# Patient Record
Sex: MALE | Race: WHITE | Employment: OTHER | URBAN - METROPOLITAN AREA
[De-identification: names, ages, dates, MRNs, and addresses within clinical notes are randomized per-mention and may not be internally consistent; named-entity substitution may affect disease eponyms.]

---

## 2017-03-24 ENCOUNTER — APPOINTMENT (OUTPATIENT)
Dept: GENERAL RADIOLOGY | Age: 62
End: 2017-03-24
Payer: MEDICARE

## 2017-03-24 ENCOUNTER — HOSPITAL ENCOUNTER (EMERGENCY)
Age: 62
Discharge: HOME | End: 2017-03-24
Payer: MEDICARE

## 2017-03-24 VITALS
SYSTOLIC BLOOD PRESSURE: 119 MMHG | BODY MASS INDEX: 30.31 KG/M2 | HEIGHT: 68 IN | WEIGHT: 200 LBS | OXYGEN SATURATION: 95 % | DIASTOLIC BLOOD PRESSURE: 71 MMHG | HEART RATE: 93 BPM | TEMPERATURE: 98.5 F | RESPIRATION RATE: 20 BRPM

## 2017-03-24 DIAGNOSIS — M79.604 PAIN IN BOTH LOWER EXTREMITIES: ICD-10-CM

## 2017-03-24 DIAGNOSIS — M79.605 PAIN IN BOTH LOWER EXTREMITIES: ICD-10-CM

## 2017-03-24 DIAGNOSIS — R10.84 GENERALIZED ABDOMINAL PAIN: Primary | ICD-10-CM

## 2017-03-24 DIAGNOSIS — M54.50 CHRONIC LOW BACK PAIN WITHOUT SCIATICA, UNSPECIFIED BACK PAIN LATERALITY: ICD-10-CM

## 2017-03-24 DIAGNOSIS — G89.29 CHRONIC LOW BACK PAIN WITHOUT SCIATICA, UNSPECIFIED BACK PAIN LATERALITY: ICD-10-CM

## 2017-03-24 LAB
ALBUMIN SERPL-MCNC: 4.3 G/DL (ref 3.9–4.9)
ALP BLD-CCNC: 91 U/L (ref 35–104)
ALT SERPL-CCNC: 26 U/L (ref 0–41)
AMYLASE: 54 U/L (ref 28–100)
ANION GAP SERPL CALCULATED.3IONS-SCNC: 13 MEQ/L (ref 7–13)
APTT: 23.7 SEC (ref 21.6–35.4)
AST SERPL-CCNC: 15 U/L (ref 0–40)
BASOPHILS ABSOLUTE: 0.1 K/UL (ref 0–0.2)
BASOPHILS RELATIVE PERCENT: 1 %
BILIRUB SERPL-MCNC: 0.3 MG/DL (ref 0–1.2)
BUN BLDV-MCNC: 19 MG/DL (ref 8–23)
CALCIUM SERPL-MCNC: 9.9 MG/DL (ref 8.6–10.2)
CHLORIDE BLD-SCNC: 96 MEQ/L (ref 98–107)
CK MB: 5.1 NG/ML (ref 0–6.7)
CO2: 25 MEQ/L (ref 22–29)
CREAT SERPL-MCNC: 0.55 MG/DL (ref 0.7–1.2)
CREATINE KINASE-MB INDEX: 5.9 % (ref 0–3.5)
EOSINOPHILS ABSOLUTE: 0.3 K/UL (ref 0–0.7)
EOSINOPHILS RELATIVE PERCENT: 2.5 %
GFR AFRICAN AMERICAN: >60
GFR NON-AFRICAN AMERICAN: >60
GLOBULIN: 2 G/DL (ref 2.3–3.5)
GLUCOSE BLD-MCNC: 225 MG/DL (ref 74–109)
HCT VFR BLD CALC: 44.8 % (ref 42–52)
HEMOGLOBIN: 14.7 G/DL (ref 14–18)
INR BLD: 0.9
LIPASE: 45 U/L (ref 13–60)
LYMPHOCYTES ABSOLUTE: 2.7 K/UL (ref 1–4.8)
LYMPHOCYTES RELATIVE PERCENT: 24.4 %
MCH RBC QN AUTO: 29.3 PG (ref 27–31.3)
MCHC RBC AUTO-ENTMCNC: 32.9 % (ref 33–37)
MCV RBC AUTO: 89 FL (ref 80–100)
MONOCYTES ABSOLUTE: 0.9 K/UL (ref 0.2–0.8)
MONOCYTES RELATIVE PERCENT: 8.6 %
NEUTROPHILS ABSOLUTE: 7 K/UL (ref 1.4–6.5)
NEUTROPHILS RELATIVE PERCENT: 63.5 %
PDW BLD-RTO: 14.6 % (ref 11.5–14.5)
PLATELET # BLD: 181 K/UL (ref 130–400)
POTASSIUM SERPL-SCNC: 4.3 MEQ/L (ref 3.5–5.1)
PROTHROMBIN TIME: 10 SEC (ref 8.1–13.7)
RBC # BLD: 5.03 M/UL (ref 4.7–6.1)
SODIUM BLD-SCNC: 134 MEQ/L (ref 132–144)
TOTAL CK: 86 U/L (ref 0–190)
TOTAL PROTEIN: 6.3 G/DL (ref 6.4–8.1)
TROPONIN: <0.01 NG/ML (ref 0–0.01)
WBC # BLD: 11 K/UL (ref 4.8–10.8)

## 2017-03-24 PROCEDURE — 82550 ASSAY OF CK (CPK): CPT

## 2017-03-24 PROCEDURE — 71020 XR CHEST STANDARD TWO VW: CPT

## 2017-03-24 PROCEDURE — 80053 COMPREHEN METABOLIC PANEL: CPT

## 2017-03-24 PROCEDURE — 85025 COMPLETE CBC W/AUTO DIFF WBC: CPT

## 2017-03-24 PROCEDURE — 82553 CREATINE MB FRACTION: CPT

## 2017-03-24 PROCEDURE — 36415 COLL VENOUS BLD VENIPUNCTURE: CPT

## 2017-03-24 PROCEDURE — 99284 EMERGENCY DEPT VISIT MOD MDM: CPT

## 2017-03-24 PROCEDURE — 85730 THROMBOPLASTIN TIME PARTIAL: CPT

## 2017-03-24 PROCEDURE — 6360000002 HC RX W HCPCS: Performed by: PHYSICIAN ASSISTANT

## 2017-03-24 PROCEDURE — 85610 PROTHROMBIN TIME: CPT

## 2017-03-24 PROCEDURE — 82150 ASSAY OF AMYLASE: CPT

## 2017-03-24 PROCEDURE — 83690 ASSAY OF LIPASE: CPT

## 2017-03-24 PROCEDURE — 96372 THER/PROPH/DIAG INJ SC/IM: CPT

## 2017-03-24 PROCEDURE — 84484 ASSAY OF TROPONIN QUANT: CPT

## 2017-03-24 PROCEDURE — 93005 ELECTROCARDIOGRAM TRACING: CPT

## 2017-03-24 RX ORDER — ONDANSETRON 4 MG/1
4 TABLET, ORALLY DISINTEGRATING ORAL ONCE
Status: COMPLETED | OUTPATIENT
Start: 2017-03-24 | End: 2017-03-24

## 2017-03-24 RX ORDER — ORPHENADRINE CITRATE 100 MG/1
100 TABLET, EXTENDED RELEASE ORAL 2 TIMES DAILY
Qty: 10 TABLET | Refills: 0 | Status: SHIPPED | OUTPATIENT
Start: 2017-03-24 | End: 2017-03-29

## 2017-03-24 RX ORDER — ORPHENADRINE CITRATE 30 MG/ML
60 INJECTION INTRAMUSCULAR; INTRAVENOUS ONCE
Status: COMPLETED | OUTPATIENT
Start: 2017-03-24 | End: 2017-03-24

## 2017-03-24 RX ORDER — KETOROLAC TROMETHAMINE 30 MG/ML
60 INJECTION, SOLUTION INTRAMUSCULAR; INTRAVENOUS ONCE
Status: COMPLETED | OUTPATIENT
Start: 2017-03-24 | End: 2017-03-24

## 2017-03-24 RX ORDER — NAPROXEN 500 MG/1
500 TABLET ORAL 2 TIMES DAILY
Qty: 20 TABLET | Refills: 0 | Status: ON HOLD | OUTPATIENT
Start: 2017-03-24 | End: 2017-07-22 | Stop reason: ALTCHOICE

## 2017-03-24 RX ADMIN — ONDANSETRON 4 MG: 4 TABLET, ORALLY DISINTEGRATING ORAL at 17:27

## 2017-03-24 RX ADMIN — ORPHENADRINE CITRATE 60 MG: 30 INJECTION INTRAMUSCULAR; INTRAVENOUS at 18:29

## 2017-03-24 RX ADMIN — KETOROLAC TROMETHAMINE 60 MG: 60 INJECTION, SOLUTION INTRAMUSCULAR at 17:27

## 2017-03-24 ASSESSMENT — PAIN DESCRIPTION - ORIENTATION: ORIENTATION: LEFT;RIGHT;MID;ANTERIOR

## 2017-03-24 ASSESSMENT — PAIN DESCRIPTION - PAIN TYPE
TYPE: CHRONIC PAIN
TYPE: CHRONIC PAIN

## 2017-03-24 ASSESSMENT — PAIN DESCRIPTION - LOCATION
LOCATION: BACK
LOCATION: BACK
LOCATION: CHEST;LEG

## 2017-03-24 ASSESSMENT — PAIN SCALES - GENERAL
PAINLEVEL_OUTOF10: 5
PAINLEVEL_OUTOF10: 5
PAINLEVEL_OUTOF10: 8
PAINLEVEL_OUTOF10: 8

## 2017-03-24 ASSESSMENT — PAIN DESCRIPTION - ONSET: ONSET: ON-GOING

## 2017-03-24 ASSESSMENT — PAIN DESCRIPTION - DESCRIPTORS
DESCRIPTORS: ACHING
DESCRIPTORS: ACHING

## 2017-03-27 LAB
EKG ATRIAL RATE: 123 BPM
EKG P AXIS: 87 DEGREES
EKG P-R INTERVAL: 164 MS
EKG Q-T INTERVAL: 308 MS
EKG QRS DURATION: 94 MS
EKG QTC CALCULATION (BAZETT): 440 MS
EKG R AXIS: -48 DEGREES
EKG T AXIS: 97 DEGREES
EKG VENTRICULAR RATE: 123 BPM

## 2017-04-23 ENCOUNTER — HOSPITAL ENCOUNTER (EMERGENCY)
Age: 62
Discharge: LEFT BEFORE DISCHARGE | End: 2017-04-23
Payer: MEDICARE

## 2017-04-23 ENCOUNTER — HOSPITAL ENCOUNTER (EMERGENCY)
Age: 62
Discharge: HOME | End: 2017-04-23
Attending: EMERGENCY MEDICINE
Payer: MEDICARE

## 2017-04-23 VITALS
SYSTOLIC BLOOD PRESSURE: 140 MMHG | OXYGEN SATURATION: 96 % | HEIGHT: 68 IN | DIASTOLIC BLOOD PRESSURE: 76 MMHG | BODY MASS INDEX: 33.34 KG/M2 | TEMPERATURE: 98.8 F | HEART RATE: 115 BPM | RESPIRATION RATE: 20 BRPM | WEIGHT: 220 LBS

## 2017-04-23 VITALS
DIASTOLIC BLOOD PRESSURE: 94 MMHG | RESPIRATION RATE: 20 BRPM | SYSTOLIC BLOOD PRESSURE: 152 MMHG | BODY MASS INDEX: 33.34 KG/M2 | WEIGHT: 220 LBS | HEART RATE: 95 BPM | TEMPERATURE: 98.5 F | HEIGHT: 68 IN | OXYGEN SATURATION: 99 %

## 2017-04-23 DIAGNOSIS — G89.4 CHRONIC PAIN SYNDROME: ICD-10-CM

## 2017-04-23 DIAGNOSIS — R05.9 COUGH: Primary | ICD-10-CM

## 2017-04-23 DIAGNOSIS — R07.89 ATYPICAL CHEST PAIN: Primary | ICD-10-CM

## 2017-04-23 LAB
ANION GAP SERPL CALCULATED.3IONS-SCNC: 15 MEQ/L (ref 7–13)
BASOPHILS ABSOLUTE: 0 K/UL (ref 0–0.2)
BASOPHILS RELATIVE PERCENT: 0.6 %
BUN BLDV-MCNC: 14 MG/DL (ref 8–23)
CALCIUM SERPL-MCNC: 9.6 MG/DL (ref 8.6–10.2)
CHLORIDE BLD-SCNC: 101 MEQ/L (ref 98–107)
CO2: 25 MEQ/L (ref 22–29)
CREAT SERPL-MCNC: 0.66 MG/DL (ref 0.7–1.2)
EOSINOPHILS ABSOLUTE: 0.4 K/UL (ref 0–0.7)
EOSINOPHILS RELATIVE PERCENT: 6 %
GFR AFRICAN AMERICAN: >60
GFR NON-AFRICAN AMERICAN: >60
GLUCOSE BLD-MCNC: 178 MG/DL (ref 74–109)
HCT VFR BLD CALC: 34.3 % (ref 42–52)
HEMOGLOBIN: 11.8 G/DL (ref 14–18)
LIPASE: 33 U/L (ref 13–60)
LYMPHOCYTES ABSOLUTE: 2.5 K/UL (ref 1–4.8)
LYMPHOCYTES RELATIVE PERCENT: 35.6 %
MCH RBC QN AUTO: 30.3 PG (ref 27–31.3)
MCHC RBC AUTO-ENTMCNC: 34.5 % (ref 33–37)
MCV RBC AUTO: 88 FL (ref 80–100)
MONOCYTES ABSOLUTE: 0.9 K/UL (ref 0.2–0.8)
MONOCYTES RELATIVE PERCENT: 12.5 %
NEUTROPHILS ABSOLUTE: 3.1 K/UL (ref 1.4–6.5)
NEUTROPHILS RELATIVE PERCENT: 45.3 %
PDW BLD-RTO: 14 % (ref 11.5–14.5)
PLATELET # BLD: 182 K/UL (ref 130–400)
POTASSIUM SERPL-SCNC: 4.2 MEQ/L (ref 3.5–5.1)
RBC # BLD: 3.9 M/UL (ref 4.7–6.1)
SODIUM BLD-SCNC: 141 MEQ/L (ref 132–144)
TROPONIN: <0.01 NG/ML (ref 0–0.01)
WBC # BLD: 6.9 K/UL (ref 4.8–10.8)

## 2017-04-23 PROCEDURE — 96372 THER/PROPH/DIAG INJ SC/IM: CPT

## 2017-04-23 PROCEDURE — 99285 EMERGENCY DEPT VISIT HI MDM: CPT

## 2017-04-23 PROCEDURE — 84484 ASSAY OF TROPONIN QUANT: CPT

## 2017-04-23 PROCEDURE — 99284 EMERGENCY DEPT VISIT MOD MDM: CPT

## 2017-04-23 PROCEDURE — 93005 ELECTROCARDIOGRAM TRACING: CPT

## 2017-04-23 PROCEDURE — 83690 ASSAY OF LIPASE: CPT

## 2017-04-23 PROCEDURE — 80048 BASIC METABOLIC PNL TOTAL CA: CPT

## 2017-04-23 PROCEDURE — 6360000002 HC RX W HCPCS: Performed by: EMERGENCY MEDICINE

## 2017-04-23 PROCEDURE — 85025 COMPLETE CBC W/AUTO DIFF WBC: CPT

## 2017-04-23 RX ORDER — INSULIN GLARGINE 100 [IU]/ML
10 INJECTION, SOLUTION SUBCUTANEOUS NIGHTLY
Status: ON HOLD | COMMUNITY
End: 2017-07-22 | Stop reason: ALTCHOICE

## 2017-04-23 RX ORDER — KETOROLAC TROMETHAMINE 30 MG/ML
60 INJECTION, SOLUTION INTRAMUSCULAR; INTRAVENOUS ONCE
Status: COMPLETED | OUTPATIENT
Start: 2017-04-23 | End: 2017-04-23

## 2017-04-23 RX ORDER — KETOROLAC TROMETHAMINE 10 MG/1
10 TABLET, FILM COATED ORAL EVERY 6 HOURS PRN
Qty: 20 TABLET | Refills: 0 | Status: SHIPPED | OUTPATIENT
Start: 2017-04-23 | End: 2017-04-27

## 2017-04-23 RX ORDER — CLOPIDOGREL BISULFATE 75 MG/1
75 TABLET ORAL DAILY
Status: ON HOLD | COMMUNITY
End: 2017-07-22 | Stop reason: SDUPTHER

## 2017-04-23 RX ADMIN — KETOROLAC TROMETHAMINE 60 MG: 30 INJECTION, SOLUTION INTRAMUSCULAR at 00:31

## 2017-04-23 ASSESSMENT — ENCOUNTER SYMPTOMS
COLOR CHANGE: 0
EYE PAIN: 0
ABDOMINAL DISTENTION: 0
COUGH: 1
TROUBLE SWALLOWING: 0
VOMITING: 0
ALLERGIC/IMMUNOLOGIC NEGATIVE: 1
RHINORRHEA: 0
SORE THROAT: 0
ABDOMINAL PAIN: 1
CONSTIPATION: 0
PHOTOPHOBIA: 0
WHEEZING: 0
DIARRHEA: 0
SHORTNESS OF BREATH: 0
NAUSEA: 0
ABDOMINAL PAIN: 1
APNEA: 0
COUGH: 0
SHORTNESS OF BREATH: 1
COLOR CHANGE: 0
SINUS PRESSURE: 0
BACK PAIN: 0
APNEA: 0
EYE PAIN: 0

## 2017-04-23 ASSESSMENT — PAIN SCALES - GENERAL
PAINLEVEL_OUTOF10: 10
PAINLEVEL_OUTOF10: 5
PAINLEVEL_OUTOF10: 10

## 2017-04-23 ASSESSMENT — PAIN DESCRIPTION - DESCRIPTORS
DESCRIPTORS: ACHING
DESCRIPTORS: JABBING

## 2017-04-23 ASSESSMENT — PAIN DESCRIPTION - PAIN TYPE: TYPE: ACUTE PAIN

## 2017-04-23 ASSESSMENT — PAIN DESCRIPTION - LOCATION: LOCATION: CHEST

## 2017-04-27 ENCOUNTER — APPOINTMENT (OUTPATIENT)
Dept: GENERAL RADIOLOGY | Age: 62
End: 2017-04-27
Payer: MEDICARE

## 2017-04-27 ENCOUNTER — HOSPITAL ENCOUNTER (EMERGENCY)
Age: 62
Discharge: HOME | End: 2017-04-27
Attending: EMERGENCY MEDICINE
Payer: MEDICARE

## 2017-04-27 VITALS
WEIGHT: 220 LBS | SYSTOLIC BLOOD PRESSURE: 143 MMHG | HEIGHT: 68 IN | OXYGEN SATURATION: 96 % | DIASTOLIC BLOOD PRESSURE: 87 MMHG | BODY MASS INDEX: 33.34 KG/M2 | TEMPERATURE: 98.5 F | RESPIRATION RATE: 18 BRPM | HEART RATE: 98 BPM

## 2017-04-27 DIAGNOSIS — R07.89 CHEST PAIN, ATYPICAL: Primary | ICD-10-CM

## 2017-04-27 LAB
ALBUMIN SERPL-MCNC: 4.1 G/DL (ref 3.9–4.9)
ALP BLD-CCNC: 85 U/L (ref 35–104)
ALT SERPL-CCNC: 20 U/L (ref 0–41)
ANION GAP SERPL CALCULATED.3IONS-SCNC: 12 MEQ/L (ref 7–13)
AST SERPL-CCNC: 15 U/L (ref 0–40)
BASOPHILS ABSOLUTE: 0 K/UL (ref 0–0.2)
BASOPHILS RELATIVE PERCENT: 0.6 %
BILIRUB SERPL-MCNC: 0.1 MG/DL (ref 0–1.2)
BILIRUBIN DIRECT: 0 MG/DL (ref 0–0.3)
BILIRUBIN, INDIRECT: 0.1 MG/DL (ref 0–0.6)
BUN BLDV-MCNC: 18 MG/DL (ref 8–23)
CALCIUM SERPL-MCNC: 10 MG/DL (ref 8.6–10.2)
CHLORIDE BLD-SCNC: 101 MEQ/L (ref 98–107)
CO2: 25 MEQ/L (ref 22–29)
CREAT SERPL-MCNC: 0.7 MG/DL (ref 0.7–1.2)
EKG ATRIAL RATE: 95 BPM
EKG P AXIS: 80 DEGREES
EKG P-R INTERVAL: 180 MS
EKG Q-T INTERVAL: 354 MS
EKG QRS DURATION: 100 MS
EKG QTC CALCULATION (BAZETT): 444 MS
EKG R AXIS: -48 DEGREES
EKG T AXIS: 59 DEGREES
EKG VENTRICULAR RATE: 95 BPM
EOSINOPHILS ABSOLUTE: 0.2 K/UL (ref 0–0.7)
EOSINOPHILS RELATIVE PERCENT: 2.3 %
GFR AFRICAN AMERICAN: >60
GFR NON-AFRICAN AMERICAN: >60
GLUCOSE BLD-MCNC: 211 MG/DL (ref 74–109)
HCT VFR BLD CALC: 38.2 % (ref 42–52)
HEMOGLOBIN: 12.8 G/DL (ref 14–18)
LIPASE: 25 U/L (ref 13–60)
LYMPHOCYTES ABSOLUTE: 2.1 K/UL (ref 1–4.8)
LYMPHOCYTES RELATIVE PERCENT: 24 %
MCH RBC QN AUTO: 29.2 PG (ref 27–31.3)
MCHC RBC AUTO-ENTMCNC: 33.4 % (ref 33–37)
MCV RBC AUTO: 87.5 FL (ref 80–100)
MONOCYTES ABSOLUTE: 1 K/UL (ref 0.2–0.8)
MONOCYTES RELATIVE PERCENT: 12.1 %
NEUTROPHILS ABSOLUTE: 5.3 K/UL (ref 1.4–6.5)
NEUTROPHILS RELATIVE PERCENT: 61 %
PDW BLD-RTO: 14.1 % (ref 11.5–14.5)
PLATELET # BLD: 153 K/UL (ref 130–400)
POTASSIUM SERPL-SCNC: 4.7 MEQ/L (ref 3.5–5.1)
RBC # BLD: 4.36 M/UL (ref 4.7–6.1)
SODIUM BLD-SCNC: 138 MEQ/L (ref 132–144)
TOTAL PROTEIN: 5.9 G/DL (ref 6.4–8.1)
TROPONIN: <0.01 NG/ML (ref 0–0.01)
WBC # BLD: 8.6 K/UL (ref 4.8–10.8)

## 2017-04-27 PROCEDURE — 2580000003 HC RX 258

## 2017-04-27 PROCEDURE — 83690 ASSAY OF LIPASE: CPT

## 2017-04-27 PROCEDURE — 6370000000 HC RX 637 (ALT 250 FOR IP): Performed by: EMERGENCY MEDICINE

## 2017-04-27 PROCEDURE — 93005 ELECTROCARDIOGRAM TRACING: CPT

## 2017-04-27 PROCEDURE — 71020 XR CHEST STANDARD TWO VW: CPT

## 2017-04-27 PROCEDURE — 96375 TX/PRO/DX INJ NEW DRUG ADDON: CPT

## 2017-04-27 PROCEDURE — 96374 THER/PROPH/DIAG INJ IV PUSH: CPT

## 2017-04-27 PROCEDURE — 36415 COLL VENOUS BLD VENIPUNCTURE: CPT

## 2017-04-27 PROCEDURE — 84484 ASSAY OF TROPONIN QUANT: CPT

## 2017-04-27 PROCEDURE — 85025 COMPLETE CBC W/AUTO DIFF WBC: CPT

## 2017-04-27 PROCEDURE — 6360000002 HC RX W HCPCS: Performed by: EMERGENCY MEDICINE

## 2017-04-27 PROCEDURE — 80048 BASIC METABOLIC PNL TOTAL CA: CPT

## 2017-04-27 PROCEDURE — 80076 HEPATIC FUNCTION PANEL: CPT

## 2017-04-27 PROCEDURE — 99285 EMERGENCY DEPT VISIT HI MDM: CPT

## 2017-04-27 RX ORDER — GABAPENTIN 300 MG/1
300 CAPSULE ORAL 3 TIMES DAILY
Qty: 90 CAPSULE | Refills: 0 | Status: SHIPPED | OUTPATIENT
Start: 2017-04-27

## 2017-04-27 RX ORDER — ATORVASTATIN CALCIUM 40 MG/1
40 TABLET, FILM COATED ORAL DAILY
Qty: 30 TABLET | Refills: 0 | Status: ON HOLD | OUTPATIENT
Start: 2017-04-27 | End: 2017-07-22 | Stop reason: SDUPTHER

## 2017-04-27 RX ORDER — ONDANSETRON 2 MG/ML
4 INJECTION INTRAMUSCULAR; INTRAVENOUS ONCE
Status: COMPLETED | OUTPATIENT
Start: 2017-04-27 | End: 2017-04-27

## 2017-04-27 RX ORDER — CLOPIDOGREL BISULFATE 75 MG/1
75 TABLET ORAL DAILY
Qty: 30 TABLET | Refills: 0 | Status: SHIPPED | OUTPATIENT
Start: 2017-04-27

## 2017-04-27 RX ORDER — SODIUM CHLORIDE 0.9 % (FLUSH) 0.9 %
3 SYRINGE (ML) INJECTION EVERY 8 HOURS
Status: DISCONTINUED | OUTPATIENT
Start: 2017-04-27 | End: 2017-04-27 | Stop reason: HOSPADM

## 2017-04-27 RX ORDER — ASPIRIN 81 MG/1
324 TABLET, CHEWABLE ORAL ONCE
Status: COMPLETED | OUTPATIENT
Start: 2017-04-27 | End: 2017-04-27

## 2017-04-27 RX ORDER — SODIUM CHLORIDE 9 MG/ML
INJECTION, SOLUTION INTRAVENOUS
Status: COMPLETED
Start: 2017-04-27 | End: 2017-04-27

## 2017-04-27 RX ORDER — KETOROLAC TROMETHAMINE 30 MG/ML
30 INJECTION, SOLUTION INTRAMUSCULAR; INTRAVENOUS ONCE
Status: COMPLETED | OUTPATIENT
Start: 2017-04-27 | End: 2017-04-27

## 2017-04-27 RX ORDER — LISINOPRIL 10 MG/1
10 TABLET ORAL DAILY
Qty: 30 TABLET | Refills: 0 | Status: SHIPPED | OUTPATIENT
Start: 2017-04-27

## 2017-04-27 RX ADMIN — ASPIRIN 324 MG: 81 TABLET, CHEWABLE ORAL at 14:32

## 2017-04-27 RX ADMIN — SODIUM CHLORIDE 1000 ML: 9 INJECTION, SOLUTION INTRAVENOUS at 14:32

## 2017-04-27 RX ADMIN — KETOROLAC TROMETHAMINE 30 MG: 30 INJECTION, SOLUTION INTRAMUSCULAR; INTRAVENOUS at 15:56

## 2017-04-27 RX ADMIN — ONDANSETRON 4 MG: 2 INJECTION, SOLUTION INTRAMUSCULAR; INTRAVENOUS at 15:56

## 2017-04-27 ASSESSMENT — PAIN DESCRIPTION - LOCATION: LOCATION: CHEST

## 2017-04-27 ASSESSMENT — ENCOUNTER SYMPTOMS
COUGH: 0
COLOR CHANGE: 0
DIARRHEA: 0
SHORTNESS OF BREATH: 0
ABDOMINAL PAIN: 0
VOMITING: 0
NAUSEA: 0
CONSTIPATION: 0
STRIDOR: 0
WHEEZING: 0

## 2017-04-27 ASSESSMENT — PAIN SCALES - GENERAL
PAINLEVEL_OUTOF10: 8
PAINLEVEL_OUTOF10: 8

## 2017-04-28 LAB
EKG ATRIAL RATE: 108 BPM
EKG P AXIS: 52 DEGREES
EKG P-R INTERVAL: 178 MS
EKG Q-T INTERVAL: 324 MS
EKG QRS DURATION: 94 MS
EKG QTC CALCULATION (BAZETT): 434 MS
EKG R AXIS: -46 DEGREES
EKG T AXIS: 90 DEGREES
EKG VENTRICULAR RATE: 108 BPM

## 2017-05-01 ENCOUNTER — HOSPITAL ENCOUNTER (EMERGENCY)
Age: 62
Discharge: HOME | End: 2017-05-01
Payer: MEDICARE

## 2017-05-01 ENCOUNTER — APPOINTMENT (OUTPATIENT)
Dept: GENERAL RADIOLOGY | Age: 62
End: 2017-05-01
Payer: MEDICARE

## 2017-05-01 VITALS
HEART RATE: 89 BPM | OXYGEN SATURATION: 99 % | TEMPERATURE: 98.5 F | SYSTOLIC BLOOD PRESSURE: 131 MMHG | DIASTOLIC BLOOD PRESSURE: 70 MMHG | WEIGHT: 210 LBS | RESPIRATION RATE: 18 BRPM | BODY MASS INDEX: 31.93 KG/M2

## 2017-05-01 DIAGNOSIS — R07.9 CHEST PAIN, UNSPECIFIED TYPE: Primary | ICD-10-CM

## 2017-05-01 LAB
ALBUMIN SERPL-MCNC: 4.7 G/DL (ref 3.9–4.9)
ALP BLD-CCNC: 86 U/L (ref 35–104)
ALT SERPL-CCNC: 20 U/L (ref 0–41)
ANION GAP SERPL CALCULATED.3IONS-SCNC: 12 MEQ/L (ref 7–13)
AST SERPL-CCNC: 18 U/L (ref 0–40)
BASOPHILS ABSOLUTE: 0.1 K/UL (ref 0–0.2)
BASOPHILS RELATIVE PERCENT: 0.7 %
BILIRUB SERPL-MCNC: 0.2 MG/DL (ref 0–1.2)
BUN BLDV-MCNC: 17 MG/DL (ref 8–23)
CALCIUM SERPL-MCNC: 9.6 MG/DL (ref 8.6–10.2)
CHLORIDE BLD-SCNC: 98 MEQ/L (ref 98–107)
CO2: 24 MEQ/L (ref 22–29)
CREAT SERPL-MCNC: 0.77 MG/DL (ref 0.7–1.2)
EOSINOPHILS ABSOLUTE: 0.2 K/UL (ref 0–0.7)
EOSINOPHILS RELATIVE PERCENT: 2.5 %
GFR AFRICAN AMERICAN: >60
GFR NON-AFRICAN AMERICAN: >60
GLOBULIN: 2.4 G/DL (ref 2.3–3.5)
GLUCOSE BLD-MCNC: 144 MG/DL (ref 74–109)
HCT VFR BLD CALC: 42.3 % (ref 42–52)
HEMOGLOBIN: 14 G/DL (ref 14–18)
LIPASE: 39 U/L (ref 13–60)
LYMPHOCYTES ABSOLUTE: 2.9 K/UL (ref 1–4.8)
LYMPHOCYTES RELATIVE PERCENT: 29.2 %
MCH RBC QN AUTO: 29.3 PG (ref 27–31.3)
MCHC RBC AUTO-ENTMCNC: 33 % (ref 33–37)
MCV RBC AUTO: 88.8 FL (ref 80–100)
MONOCYTES ABSOLUTE: 1.3 K/UL (ref 0.2–0.8)
MONOCYTES RELATIVE PERCENT: 12.9 %
NEUTROPHILS ABSOLUTE: 5.4 K/UL (ref 1.4–6.5)
NEUTROPHILS RELATIVE PERCENT: 54.7 %
PDW BLD-RTO: 14.3 % (ref 11.5–14.5)
PLATELET # BLD: 183 K/UL (ref 130–400)
POTASSIUM SERPL-SCNC: 4.2 MEQ/L (ref 3.5–5.1)
RBC # BLD: 4.77 M/UL (ref 4.7–6.1)
SODIUM BLD-SCNC: 134 MEQ/L (ref 132–144)
TOTAL PROTEIN: 7.1 G/DL (ref 6.4–8.1)
TROPONIN: <0.01 NG/ML (ref 0–0.01)
WBC # BLD: 9.8 K/UL (ref 4.8–10.8)

## 2017-05-01 PROCEDURE — 2580000003 HC RX 258: Performed by: PERSONAL EMERGENCY RESPONSE ATTENDANT

## 2017-05-01 PROCEDURE — 6370000000 HC RX 637 (ALT 250 FOR IP): Performed by: PERSONAL EMERGENCY RESPONSE ATTENDANT

## 2017-05-01 PROCEDURE — 83690 ASSAY OF LIPASE: CPT

## 2017-05-01 PROCEDURE — 80053 COMPREHEN METABOLIC PANEL: CPT

## 2017-05-01 PROCEDURE — 6360000002 HC RX W HCPCS: Performed by: PERSONAL EMERGENCY RESPONSE ATTENDANT

## 2017-05-01 PROCEDURE — 84484 ASSAY OF TROPONIN QUANT: CPT

## 2017-05-01 PROCEDURE — 36415 COLL VENOUS BLD VENIPUNCTURE: CPT

## 2017-05-01 PROCEDURE — 96374 THER/PROPH/DIAG INJ IV PUSH: CPT

## 2017-05-01 PROCEDURE — 71010 XR CHEST PORTABLE: CPT

## 2017-05-01 PROCEDURE — 93005 ELECTROCARDIOGRAM TRACING: CPT

## 2017-05-01 PROCEDURE — 85025 COMPLETE CBC W/AUTO DIFF WBC: CPT

## 2017-05-01 PROCEDURE — 99285 EMERGENCY DEPT VISIT HI MDM: CPT

## 2017-05-01 RX ORDER — ASPIRIN 81 MG/1
324 TABLET, CHEWABLE ORAL ONCE
Status: COMPLETED | OUTPATIENT
Start: 2017-05-01 | End: 2017-05-01

## 2017-05-01 RX ORDER — 0.9 % SODIUM CHLORIDE 0.9 %
1000 INTRAVENOUS SOLUTION INTRAVENOUS ONCE
Status: COMPLETED | OUTPATIENT
Start: 2017-05-01 | End: 2017-05-01

## 2017-05-01 RX ORDER — KETOROLAC TROMETHAMINE 30 MG/ML
30 INJECTION, SOLUTION INTRAMUSCULAR; INTRAVENOUS ONCE
Status: COMPLETED | OUTPATIENT
Start: 2017-05-01 | End: 2017-05-01

## 2017-05-01 RX ADMIN — KETOROLAC TROMETHAMINE 30 MG: 30 INJECTION, SOLUTION INTRAMUSCULAR; INTRAVENOUS at 19:25

## 2017-05-01 RX ADMIN — ASPIRIN 324 MG: 81 TABLET, CHEWABLE ORAL at 19:25

## 2017-05-01 RX ADMIN — SODIUM CHLORIDE 1000 ML: 9 INJECTION, SOLUTION INTRAVENOUS at 19:25

## 2017-05-01 ASSESSMENT — PAIN SCALES - GENERAL
PAINLEVEL_OUTOF10: 6
PAINLEVEL_OUTOF10: 8

## 2017-05-01 ASSESSMENT — PAIN DESCRIPTION - LOCATION
LOCATION: ABDOMEN
LOCATION: CHEST;ARM

## 2017-05-01 ASSESSMENT — PAIN DESCRIPTION - PAIN TYPE: TYPE: CHRONIC PAIN

## 2017-05-01 ASSESSMENT — ENCOUNTER SYMPTOMS
NAUSEA: 1
SORE THROAT: 0
BLOOD IN STOOL: 0
COLOR CHANGE: 0
ABDOMINAL PAIN: 1
DIARRHEA: 0
RHINORRHEA: 0
VOMITING: 1
COUGH: 0
SHORTNESS OF BREATH: 1

## 2017-05-01 ASSESSMENT — PAIN DESCRIPTION - ORIENTATION: ORIENTATION: LEFT

## 2017-05-01 ASSESSMENT — PAIN DESCRIPTION - DESCRIPTORS: DESCRIPTORS: ACHING

## 2017-05-04 LAB
EKG ATRIAL RATE: 102 BPM
EKG P AXIS: 29 DEGREES
EKG P-R INTERVAL: 176 MS
EKG Q-T INTERVAL: 358 MS
EKG QRS DURATION: 102 MS
EKG QTC CALCULATION (BAZETT): 466 MS
EKG R AXIS: -49 DEGREES
EKG T AXIS: 77 DEGREES
EKG VENTRICULAR RATE: 102 BPM

## 2017-05-06 ENCOUNTER — HOSPITAL ENCOUNTER (EMERGENCY)
Age: 62
Discharge: HOME | End: 2017-05-06
Payer: MEDICARE

## 2017-05-06 VITALS
WEIGHT: 220 LBS | DIASTOLIC BLOOD PRESSURE: 97 MMHG | BODY MASS INDEX: 33.34 KG/M2 | RESPIRATION RATE: 20 BRPM | SYSTOLIC BLOOD PRESSURE: 164 MMHG | OXYGEN SATURATION: 97 % | TEMPERATURE: 99.1 F | HEART RATE: 100 BPM | HEIGHT: 68 IN

## 2017-05-06 DIAGNOSIS — I10 ESSENTIAL HYPERTENSION: ICD-10-CM

## 2017-05-06 DIAGNOSIS — G89.29 OTHER CHRONIC PAIN: Primary | ICD-10-CM

## 2017-05-06 PROCEDURE — 6370000000 HC RX 637 (ALT 250 FOR IP): Performed by: PHYSICIAN ASSISTANT

## 2017-05-06 PROCEDURE — 99283 EMERGENCY DEPT VISIT LOW MDM: CPT

## 2017-05-06 PROCEDURE — 93005 ELECTROCARDIOGRAM TRACING: CPT

## 2017-05-06 RX ORDER — CLONIDINE HYDROCHLORIDE 0.1 MG/1
0.2 TABLET ORAL ONCE
Status: COMPLETED | OUTPATIENT
Start: 2017-05-06 | End: 2017-05-06

## 2017-05-06 RX ADMIN — CLONIDINE HYDROCHLORIDE 0.2 MG: 0.1 TABLET ORAL at 18:24

## 2017-05-06 ASSESSMENT — ENCOUNTER SYMPTOMS
APNEA: 0
ABDOMINAL PAIN: 1
SHORTNESS OF BREATH: 0
COUGH: 1
ALLERGIC/IMMUNOLOGIC NEGATIVE: 1
EYE PAIN: 0
BACK PAIN: 1
COLOR CHANGE: 0
TROUBLE SWALLOWING: 0

## 2017-05-06 ASSESSMENT — PAIN DESCRIPTION - PAIN TYPE: TYPE: CHRONIC PAIN

## 2017-05-06 ASSESSMENT — PAIN SCALES - GENERAL: PAINLEVEL_OUTOF10: 10

## 2017-05-08 LAB
EKG ATRIAL RATE: 99 BPM
EKG P AXIS: 69 DEGREES
EKG P-R INTERVAL: 184 MS
EKG Q-T INTERVAL: 354 MS
EKG QRS DURATION: 100 MS
EKG QTC CALCULATION (BAZETT): 454 MS
EKG R AXIS: -46 DEGREES
EKG T AXIS: 56 DEGREES
EKG VENTRICULAR RATE: 99 BPM

## 2018-12-27 ENCOUNTER — HOSPITAL ENCOUNTER (EMERGENCY)
Facility: HOSPITAL | Age: 63
Discharge: HOME | End: 2018-12-27
Attending: EMERGENCY MEDICINE
Payer: MEDICARE

## 2018-12-27 VITALS
HEART RATE: 91 BPM | SYSTOLIC BLOOD PRESSURE: 143 MMHG | OXYGEN SATURATION: 98 % | DIASTOLIC BLOOD PRESSURE: 79 MMHG | RESPIRATION RATE: 20 BRPM | TEMPERATURE: 98 F

## 2018-12-27 DIAGNOSIS — D64.9 NORMOCYTIC ANEMIA: ICD-10-CM

## 2018-12-27 DIAGNOSIS — R31.29 OTHER MICROSCOPIC HEMATURIA: Primary | ICD-10-CM

## 2018-12-27 DIAGNOSIS — R00.0 TACHYCARDIA: ICD-10-CM

## 2018-12-27 LAB
ALBUMIN SERPL BCP-MCNC: 3.7 G/DL
ALP SERPL-CCNC: 73 U/L
ALT SERPL W/O P-5'-P-CCNC: 16 U/L
AMMONIA PLAS-SCNC: 42 UMOL/L
AMPHET+METHAMPHET UR QL: NEGATIVE
ANION GAP SERPL CALC-SCNC: 11 MMOL/L
AST SERPL-CCNC: 15 U/L
BACTERIA #/AREA URNS AUTO: ABNORMAL /HPF
BARBITURATES UR QL SCN>200 NG/ML: NEGATIVE
BASOPHILS # BLD AUTO: 0.01 K/UL
BASOPHILS NFR BLD: 0.1 %
BENZODIAZ UR QL SCN>200 NG/ML: NEGATIVE
BILIRUB SERPL-MCNC: 0.3 MG/DL
BILIRUB UR QL STRIP: NEGATIVE
BNP SERPL-MCNC: 119 PG/ML
BUN SERPL-MCNC: 17 MG/DL
BZE UR QL SCN: NEGATIVE
CALCIUM SERPL-MCNC: 9.7 MG/DL
CANNABINOIDS UR QL SCN: NEGATIVE
CHLORIDE SERPL-SCNC: 103 MMOL/L
CLARITY UR REFRACT.AUTO: ABNORMAL
CO2 SERPL-SCNC: 21 MMOL/L
COLOR UR AUTO: YELLOW
CREAT SERPL-MCNC: 0.8 MG/DL
CREAT UR-MCNC: 111 MG/DL
CTP QC/QA: YES
DIFFERENTIAL METHOD: ABNORMAL
EOSINOPHIL # BLD AUTO: 0 K/UL
EOSINOPHIL NFR BLD: 0 %
ERYTHROCYTE [DISTWIDTH] IN BLOOD BY AUTOMATED COUNT: 15.5 %
EST. GFR  (AFRICAN AMERICAN): >60 ML/MIN/1.73 M^2
EST. GFR  (NON AFRICAN AMERICAN): >60 ML/MIN/1.73 M^2
ETHANOL SERPL-MCNC: <10 MG/DL
GLUCOSE SERPL-MCNC: 213 MG/DL
GLUCOSE UR QL STRIP: ABNORMAL
HCT VFR BLD AUTO: 36.4 %
HGB BLD-MCNC: 11 G/DL
HGB UR QL STRIP: ABNORMAL
HYALINE CASTS UR QL AUTO: 0 /LPF
IMM GRANULOCYTES # BLD AUTO: 0.06 K/UL
IMM GRANULOCYTES NFR BLD AUTO: 0.4 %
INR PPP: 1
KETONES UR QL STRIP: ABNORMAL
LEUKOCYTE ESTERASE UR QL STRIP: NEGATIVE
LIPASE SERPL-CCNC: 19 U/L
LYMPHOCYTES # BLD AUTO: 1.1 K/UL
LYMPHOCYTES NFR BLD: 7.7 %
MAGNESIUM SERPL-MCNC: 2 MG/DL
MCH RBC QN AUTO: 25 PG
MCHC RBC AUTO-ENTMCNC: 30.2 G/DL
MCV RBC AUTO: 83 FL
METHADONE UR QL SCN>300 NG/ML: NEGATIVE
MICROSCOPIC COMMENT: ABNORMAL
MONOCYTES # BLD AUTO: 1.1 K/UL
MONOCYTES NFR BLD: 7.9 %
NEUTROPHILS # BLD AUTO: 11.4 K/UL
NEUTROPHILS NFR BLD: 83.9 %
NITRITE UR QL STRIP: NEGATIVE
NRBC BLD-RTO: 0 /100 WBC
OPIATES UR QL SCN: NEGATIVE
PCP UR QL SCN>25 NG/ML: NEGATIVE
PH UR STRIP: 5 [PH] (ref 5–8)
PHOSPHATE SERPL-MCNC: 3.2 MG/DL
PLATELET # BLD AUTO: 181 K/UL
PMV BLD AUTO: 9 FL
POC MOLECULAR INFLUENZA A AGN: NEGATIVE
POC MOLECULAR INFLUENZA B AGN: NEGATIVE
POTASSIUM SERPL-SCNC: 4.4 MMOL/L
PROT SERPL-MCNC: 6.7 G/DL
PROT UR QL STRIP: ABNORMAL
PROTHROMBIN TIME: 10.5 SEC
RBC # BLD AUTO: 4.4 M/UL
RBC #/AREA URNS AUTO: >100 /HPF (ref 0–4)
SODIUM SERPL-SCNC: 135 MMOL/L
SP GR UR STRIP: 1.03 (ref 1–1.03)
TOXICOLOGY INFORMATION: NORMAL
TROPONIN I SERPL DL<=0.01 NG/ML-MCNC: 0.02 NG/ML
URN SPEC COLLECT METH UR: ABNORMAL
WBC # BLD AUTO: 13.57 K/UL
WBC #/AREA URNS AUTO: 7 /HPF (ref 0–5)
YEAST UR QL AUTO: ABNORMAL

## 2018-12-27 PROCEDURE — 99285 EMERGENCY DEPT VISIT HI MDM: CPT | Mod: 25

## 2018-12-27 PROCEDURE — 83735 ASSAY OF MAGNESIUM: CPT

## 2018-12-27 PROCEDURE — 85025 COMPLETE CBC W/AUTO DIFF WBC: CPT

## 2018-12-27 PROCEDURE — 83880 ASSAY OF NATRIURETIC PEPTIDE: CPT

## 2018-12-27 PROCEDURE — 81001 URINALYSIS AUTO W/SCOPE: CPT

## 2018-12-27 PROCEDURE — 96361 HYDRATE IV INFUSION ADD-ON: CPT

## 2018-12-27 PROCEDURE — 93010 ELECTROCARDIOGRAM REPORT: CPT | Mod: ,,, | Performed by: INTERNAL MEDICINE

## 2018-12-27 PROCEDURE — 94640 AIRWAY INHALATION TREATMENT: CPT

## 2018-12-27 PROCEDURE — 25500020 PHARM REV CODE 255: Performed by: EMERGENCY MEDICINE

## 2018-12-27 PROCEDURE — 25000242 PHARM REV CODE 250 ALT 637 W/ HCPCS: Performed by: STUDENT IN AN ORGANIZED HEALTH CARE EDUCATION/TRAINING PROGRAM

## 2018-12-27 PROCEDURE — 99284 EMERGENCY DEPT VISIT MOD MDM: CPT | Mod: ,,, | Performed by: EMERGENCY MEDICINE

## 2018-12-27 PROCEDURE — 83690 ASSAY OF LIPASE: CPT

## 2018-12-27 PROCEDURE — 80320 DRUG SCREEN QUANTALCOHOLS: CPT

## 2018-12-27 PROCEDURE — 96374 THER/PROPH/DIAG INJ IV PUSH: CPT | Mod: 59

## 2018-12-27 PROCEDURE — 80307 DRUG TEST PRSMV CHEM ANLYZR: CPT

## 2018-12-27 PROCEDURE — 94761 N-INVAS EAR/PLS OXIMETRY MLT: CPT

## 2018-12-27 PROCEDURE — 82140 ASSAY OF AMMONIA: CPT

## 2018-12-27 PROCEDURE — 80053 COMPREHEN METABOLIC PANEL: CPT

## 2018-12-27 PROCEDURE — 84100 ASSAY OF PHOSPHORUS: CPT

## 2018-12-27 PROCEDURE — S0028 INJECTION, FAMOTIDINE, 20 MG: HCPCS | Performed by: EMERGENCY MEDICINE

## 2018-12-27 PROCEDURE — 93005 ELECTROCARDIOGRAM TRACING: CPT

## 2018-12-27 PROCEDURE — 84484 ASSAY OF TROPONIN QUANT: CPT

## 2018-12-27 PROCEDURE — 85610 PROTHROMBIN TIME: CPT

## 2018-12-27 PROCEDURE — 25000003 PHARM REV CODE 250: Performed by: EMERGENCY MEDICINE

## 2018-12-27 RX ORDER — FAMOTIDINE 10 MG/ML
20 INJECTION INTRAVENOUS
Status: COMPLETED | OUTPATIENT
Start: 2018-12-27 | End: 2018-12-27

## 2018-12-27 RX ORDER — IPRATROPIUM BROMIDE AND ALBUTEROL SULFATE 2.5; .5 MG/3ML; MG/3ML
3 SOLUTION RESPIRATORY (INHALATION)
Status: COMPLETED | OUTPATIENT
Start: 2018-12-27 | End: 2018-12-27

## 2018-12-27 RX ADMIN — IPRATROPIUM BROMIDE AND ALBUTEROL SULFATE 3 ML: .5; 3 SOLUTION RESPIRATORY (INHALATION) at 03:12

## 2018-12-27 RX ADMIN — IOHEXOL 100 ML: 350 INJECTION, SOLUTION INTRAVENOUS at 04:12

## 2018-12-27 RX ADMIN — FAMOTIDINE 20 MG: 10 INJECTION, SOLUTION INTRAVENOUS at 02:12

## 2018-12-27 RX ADMIN — SODIUM CHLORIDE, SODIUM LACTATE, POTASSIUM CHLORIDE, AND CALCIUM CHLORIDE 1000 ML: .6; .31; .03; .02 INJECTION, SOLUTION INTRAVENOUS at 03:12

## 2018-12-27 RX ADMIN — SODIUM CHLORIDE, SODIUM LACTATE, POTASSIUM CHLORIDE, AND CALCIUM CHLORIDE 1000 ML: .6; .31; .03; .02 INJECTION, SOLUTION INTRAVENOUS at 01:12

## 2018-12-27 NOTE — ED TRIAGE NOTES
Patient states he was recently seen in the ER a couple of nights ago urethral pain and flank pain, which he states has been persistent.  He states he has had fever and chills as well. He states he spitting up and urinating blood, also endorses shortness of breath and abdominal cramping.  He also has history of pancreatitis and states he believes he has it again.  Patient also states he is experiencing dysarthria.  Patient is in check in with crutches stating  He uses them for neuropathy and pain.  Patient consistently gets off of subject stating he needs to get back to [place] and is having issues with money and getting back home, stating he stressed about the ordeal.  States he arrived to [place] on Dec 12th and has been staying on the streets since.  Patient not actively spitting up blood at check in, last states he spit up blood about six hours ago. Denies black tarry stool. Patient has been noncompliant with medication and cannot answer certain questions forwardly.

## 2018-12-27 NOTE — ED PROVIDER NOTES
Encounter Date: 12/27/2018    SCRIBE #1 NOTE: I, Gerber Bob, am scribing for, and in the presence of,  Jamaal Lew MD. I have scribed the entire note.       History     Chief Complaint   Patient presents with    Hematuria     pt complains of hematuria x 3 days. pt complains of lower back pain and abdominal pain 10/10.      63 year old male with PMHx of IDDM and pancreatitis presents to the ED with complaints of hematuria. He states that he began vomiting blood and urinating blood 3 days ago. He has also been experiencing shortness of breath, flank pain, headaches and confusion, as well as slurred speech. He is homeless and has been in [place] since 12/12/18. He denies having any black or tarry stools or any frequent stools, instead he claims to be constipated. Patient also reports chest pain and nausea.          Review of patient's allergies indicates:  No Known Allergies  No past medical history on file.  No past surgical history on file.  No family history on file.  Social History     Tobacco Use    Smoking status: Not on file   Substance Use Topics    Alcohol use: Not on file    Drug use: Not on file     Review of Systems   Constitutional: Positive for fatigue.   HENT: Negative for sore throat.    Respiratory: Positive for cough and shortness of breath.    Cardiovascular: Positive for chest pain.   Gastrointestinal: Positive for abdominal pain, constipation, nausea and vomiting. Negative for blood in stool.   Genitourinary: Positive for flank pain and hematuria.   Musculoskeletal: Positive for myalgias.   Skin: Negative for wound.   Neurological: Positive for speech difficulty and headaches.       Physical Exam     Initial Vitals [12/27/18 0007]   BP Pulse Resp Temp SpO2   133/66 (!) 113 20 98.7 °F (37.1 °C) 97 %      MAP       --         Physical Exam    Vitals reviewed.  Constitutional:   Mild dishevelled 63-year-old man, no acute distress noted   HENT:   Head: Normocephalic and atraumatic.    Multiple missing teeth without rolando acute intraoral injury   Eyes: EOM are normal. Pupils are equal, round, and reactive to light.   Neck: No tracheal deviation present.   Cardiovascular: Intact distal pulses.   Mild tachycardia is noted   Pulmonary/Chest: Breath sounds normal. No stridor. No respiratory distress. He has no wheezes.   Abdominal:   Obese, soft, mild upper abdominal tenderness noted without associated rebound   Musculoskeletal: Normal range of motion. He exhibits no edema.   Neurological: He is alert and oriented to person, place, and time. GCS score is 15. GCS eye subscore is 4. GCS verbal subscore is 5. GCS motor subscore is 6.   Skin: Skin is warm and dry.   Psychiatric: He has a normal mood and affect. Thought content normal.         ED Course   Procedures  Labs Reviewed   URINALYSIS, REFLEX TO URINE CULTURE - Abnormal; Notable for the following components:       Result Value    Appearance, UA Hazy (*)     Protein, UA 1+ (*)     Glucose, UA 3+ (*)     Ketones, UA Trace (*)     Occult Blood UA 3+ (*)     All other components within normal limits    Narrative:     Preferred Collection Type->Urine, Clean Catch   URINALYSIS MICROSCOPIC - Abnormal; Notable for the following components:    RBC, UA >100 (*)     WBC, UA 7 (*)     All other components within normal limits    Narrative:     Preferred Collection Type->Urine, Clean Catch   CBC W/ AUTO DIFFERENTIAL - Abnormal; Notable for the following components:    WBC 13.57 (*)     RBC 4.40 (*)     Hemoglobin 11.0 (*)     Hematocrit 36.4 (*)     MCH 25.0 (*)     MCHC 30.2 (*)     RDW 15.5 (*)     MPV 9.0 (*)     Gran # (ANC) 11.4 (*)     Immature Grans (Abs) 0.06 (*)     Mono # 1.1 (*)     Gran% 83.9 (*)     Lymph% 7.7 (*)     All other components within normal limits   COMPREHENSIVE METABOLIC PANEL - Abnormal; Notable for the following components:    Sodium 135 (*)     CO2 21 (*)     Glucose 213 (*)     All other components within normal limits   B-TYPE  NATRIURETIC PEPTIDE - Abnormal; Notable for the following components:     (*)     All other components within normal limits   ALCOHOL,MEDICAL (ETHANOL)   PROTIME-INR   TROPONIN I   LIPASE   AMMONIA   MAGNESIUM   PHOSPHORUS   DRUG SCREEN PANEL, URINE EMERGENCY   DRUG SCREEN PANEL, URINE EMERGENCY    Narrative:     Preferred Collection Type->Urine, Clean Catch  ADD ON TEST DRUG SCREEN PANEL PER DR CHERELLE ANTOINE ORDER   #[id number]   12/27/2018  01:04    POCT INFLUENZA A/B MOLECULAR     EKG Readings: (Independently Interpreted)   Initial Reading: No STEMI.   Sinus tachycardia with left axis deviation. Normal ST segments. 102 BPM.       Imaging Results          CT Abdomen Pelvis With Contrast (Final result)  Result time 12/27/18 05:02:37    Final result by Wes Suárez MD (12/27/18 05:02:37)                 Impression:      No acute abdominopelvic abnormality to explain patient's symptoms.    Slightly increased density within the left collecting system, possibly early excretion of contrast or blood products.  Nonobstructing right renal stone.  Borderline prostatomegaly.  Otherwise, no findings to explain the patient's hematuria.  Suggest urology follow-up for possible cystoscopy if hematuria persists.    Extensive calcific atherosclerosis involving the aortic valve/annulus, visualized coronary arteries, and distal abdominal aorta with extension into the iliac vasculature bilaterally.    Significant degenerative change about the L3-L4 joint with intervertebral disc height loss and significant sclerosis of the adjacent vertebral body endplates.    Further findings as above.    Electronically signed by resident: Jag Sheth  Date:    12/27/2018  Time:    04:35    Electronically signed by: Wes Suárez MD  Date:    12/27/2018  Time:    05:02             Narrative:    EXAMINATION:  CT ABDOMEN PELVIS WITH CONTRAST    CLINICAL HISTORY:  Infection, abdomen-pelvis;    TECHNIQUE:  Low dose axial images, sagittal  and coronal reformations were obtained from the lung bases to the pubic symphysis following the IV administration of 100 mL of Omnipaque 350 .  Oral contrast was not given. Postcontrast images were also obtained in the delayed phase.  Exam limited secondary to motion artifact.    COMPARISON:  Chest radiograph 12/27/2018.    FINDINGS:  ABDOMEN:    - Heart base: Heart normal in size without pericardial effusion.  Abnormal calcification involving the aortic valve/annulus and coronary arteries.    - Lung bases: Lung bases symmetrically expanded without pleural effusion, pneumothorax, acute consolidation, or mass.    - Liver: Normal in size and attenuation without focal abnormality.    - Gallbladder: No calcified gallstones.    - Bile Ducts: No evidence of intra or extra hepatic biliary ductal dilation.    - Spleen: Negative.    - Kidneys: Normal size and location without enhancing mass or hydroureteronephrosis.  Symmetric contrast excretion and enhancement.  Slightly greater than expected density of fluid within the left proximal ureter (venous axial image 40), possibly early excretion of contrast or blood products.  At least 1 nonobstructing nephrolith is present in the right upper kidney.    - Adrenals: Unremarkable.    - Pancreas: No mass or peripancreatic fat stranding.    - Retroperitoneum:  No significant adenopathy.    - Vascular: Moderate calcific atherosclerosis of the distal abdominal aorta with extension into the iliac vasculature bilaterally.    - Abdominal wall:  Unremarkable.    PELVIS:    No pelvic mass, adenopathy, or free fluid.  Borderline prostatomegaly, measuring up to 5 cm in transverse width.    BOWEL/MESENTERY:    No evidence of bowel obstruction or inflammation.    BONES:  Scattered degenerative changes.  Significant sclerosis of the inferior endplate L3 and superior endplate L4 vertebral bodies.                               CT Head Without Contrast (Final result)  Result time 12/27/18 04:53:57     Final result by Wes Suárez MD (12/27/18 04:53:57)                 Impression:      No acute hemorrhage or major vascular distribution infarct.  If the patient has an acute, focal neurological deficit, MRI of the brain may be indicated.    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    Mild generalized cerebral volume loss and chronic microvascular ischemic change of the supratentorial white matter.    Electronically signed by resident: Jag Sheth  Date:    12/27/2018  Time:    04:16    Electronically signed by: Wes Suárez MD  Date:    12/27/2018  Time:    04:53             Narrative:    EXAMINATION:  CT HEAD WITHOUT CONTRAST    CLINICAL HISTORY:  Confusion/delirium, altered LOC, unexplained;    TECHNIQUE:  Low dose axial CT images obtained throughout the head without intravenous contrast. Sagittal and coronal reconstructions were performed.    COMPARISON:  None.    FINDINGS:  Motion and streak artifacts somewhat limit evaluation.    Intracranial compartment:    Mild generalized cerebral volume loss and compensatory prominence of the ventricular system.  Patchy periventricular hypoattenuation involving the supratentorial white matter suggestive for chronic microvascular ischemic change.  Small focus of hypoattenuation involving the right cerebellum likely reflecting an old infarct.  No acute intraparenchymal hemorrhage or major vascular distribution infarct.  No extra-axial fluid collection or hemorrhage.  No hydrocephalus.    Skull/extracranial contents (limited evaluation): No fracture. Mastoid air cells and paranasal sinuses are essentially clear.  Skull base unremarkable.                               X-Ray Chest PA And Lateral (Final result)  Result time 12/27/18 01:59:27    Final result by Wes Suárez MD (12/27/18 01:59:27)                 Impression:      Chronic changes as above, without definite acute abnormality.      Electronically signed by: Wes Suárez  "MD  Date:    12/27/2018  Time:    01:59             Narrative:    EXAMINATION:  XR CHEST PA AND LATERAL    CLINICAL HISTORY:  Provided history is "Chest Pain;  ".    TECHNIQUE:  Frontal and lateral views of the chest were performed.    COMPARISON:  None.    FINDINGS:  Patient is slightly rotated.  Cardiac silhouette is not significantly enlarged.  Elevation of the left hemidiaphragm.  Postoperative changes of prior median sternotomy.  Linear opacities at the left lung base likely subsegmental atelectasis.  No large focal consolidation.  No sizable effusion.  No pneumothorax.  Mild degenerative changes in the thoracic spine.                                 Medical Decision Making:   Differential Diagnosis:   Hematuria, UTI, a KI, pneumonia, empyema, lethal arrhythmia, colitis, hyperglycemia  Clinical Tests:   Lab Tests: Ordered and Reviewed  Radiological Study: Ordered and Reviewed  Medical Tests: Ordered and Reviewed       APC / Resident Notes:   HO4 Update:  I assumed care of the patient from Dr. Lew. Labs unremarkable except for hematuria and slightly abnormal WBC with no evidence of infection. CTH shows old cerebellar infarct but no acute abnormality and patient has no focal deficits on exam. CTAP shows nonobstructing right renal stone and borderline prostatomegaly but no other findings to explain hematuria. Currently the patient is in no acute distress, sleeping comfortably. Will discharge with outpatient referral to urology.  Shannon Luther MD  [facility] Emergency Medicine/Internal Medicine PGY-4  12/27/2018 5:11 AM         Scribe Attestation:   Scribe #1: I performed the above scribed service and the documentation accurately describes the services I performed. I attest to the accuracy of the note.    Attending Attestation:   Physician Attestation Statement for Resident:  As the supervising MD   Physician Attestation Statement: I have personally seen and examined this patient.   I agree with the above history. -: " 63-year-old man presents for evaluation of reported hemoptysis as well as hematuria noted earlier today.   As the supervising MD I agree with the above PE.    As the supervising MD I agree with the above treatment, course, plan, and disposition.  I have reviewed and agree with the residents interpretation of the following: lab data, CT scans and EKG.                       Clinical Impression:   The primary encounter diagnosis was Other microscopic hematuria. Diagnoses of Tachycardia and Normocytic anemia were also pertinent to this visit.      Disposition:   Disposition: Discharged  Condition: Stable                        Shannon Luther MD  Resident  12/27/18 0511       Shannon Luther MD  Resident  12/27/18 0512       Jamaal Lew MD  12/27/18 [id number]

## 2019-02-13 ENCOUNTER — HOSPITAL ENCOUNTER (OUTPATIENT)
Facility: HOSPITAL | Age: 64
Discharge: HOME | End: 2019-02-14
Attending: EMERGENCY MEDICINE | Admitting: INTERNAL MEDICINE
Payer: MEDICARE

## 2019-02-13 DIAGNOSIS — R07.9 CHEST PAIN IN ADULT: ICD-10-CM

## 2019-02-13 DIAGNOSIS — R10.84 GENERALIZED ABDOMINAL PAIN: ICD-10-CM

## 2019-02-13 DIAGNOSIS — J43.2 CENTRILOBULAR EMPHYSEMA: ICD-10-CM

## 2019-02-13 DIAGNOSIS — R79.89 ELEVATED TROPONIN: ICD-10-CM

## 2019-02-13 DIAGNOSIS — E11.9 TYPE 2 DIABETES MELLITUS WITHOUT COMPLICATION, WITHOUT LONG-TERM CURRENT USE OF INSULIN: ICD-10-CM

## 2019-02-13 DIAGNOSIS — R07.9 CHEST PAIN: ICD-10-CM

## 2019-02-13 PROCEDURE — 84484 ASSAY OF TROPONIN QUANT: CPT

## 2019-02-13 PROCEDURE — 93010 EKG 12-LEAD: ICD-10-PCS | Mod: ,,, | Performed by: INTERNAL MEDICINE

## 2019-02-13 PROCEDURE — 93005 ELECTROCARDIOGRAM TRACING: CPT

## 2019-02-13 PROCEDURE — 83880 ASSAY OF NATRIURETIC PEPTIDE: CPT

## 2019-02-13 PROCEDURE — 99285 EMERGENCY DEPT VISIT HI MDM: CPT | Mod: 25

## 2019-02-13 PROCEDURE — 83690 ASSAY OF LIPASE: CPT

## 2019-02-13 PROCEDURE — 93010 ELECTROCARDIOGRAM REPORT: CPT | Mod: ,,, | Performed by: INTERNAL MEDICINE

## 2019-02-13 PROCEDURE — 85025 COMPLETE CBC W/AUTO DIFF WBC: CPT

## 2019-02-13 PROCEDURE — 80053 COMPREHEN METABOLIC PANEL: CPT

## 2019-02-13 RX ORDER — ASPIRIN 325 MG
325 TABLET ORAL
Status: DISCONTINUED | OUTPATIENT
Start: 2019-02-13 | End: 2019-02-13

## 2019-02-14 VITALS
DIASTOLIC BLOOD PRESSURE: 93 MMHG | HEART RATE: 92 BPM | SYSTOLIC BLOOD PRESSURE: 164 MMHG | OXYGEN SATURATION: 98 % | RESPIRATION RATE: 18 BRPM | TEMPERATURE: 97 F

## 2019-02-14 PROBLEM — R07.9 CHEST PAIN IN ADULT: Status: ACTIVE | Noted: 2019-02-14

## 2019-02-14 PROBLEM — R10.84 GENERALIZED ABDOMINAL PAIN: Status: ACTIVE | Noted: 2019-02-14

## 2019-02-14 PROBLEM — J43.2 CENTRILOBULAR EMPHYSEMA: Status: ACTIVE | Noted: 2019-02-14

## 2019-02-14 PROBLEM — R79.89 ELEVATED TROPONIN: Status: ACTIVE | Noted: 2019-02-14

## 2019-02-14 PROBLEM — E11.9 TYPE 2 DIABETES MELLITUS WITHOUT COMPLICATION, WITHOUT LONG-TERM CURRENT USE OF INSULIN: Status: ACTIVE | Noted: 2019-02-14

## 2019-02-14 LAB
ALBUMIN SERPL BCP-MCNC: 3.7 G/DL
ALP SERPL-CCNC: 85 U/L
ALT SERPL W/O P-5'-P-CCNC: 18 U/L
AMPHET+METHAMPHET UR QL: NEGATIVE
ANION GAP SERPL CALC-SCNC: 11 MMOL/L
AORTIC ROOT ANNULUS: 3.79 CM
AST SERPL-CCNC: 25 U/L
AV INDEX (PROSTH): 0.54
AV MEAN GRADIENT: 10 MMHG
AV PEAK GRADIENT: 19.71 MMHG
AV VALVE AREA: 2.04 CM2
AV VELOCITY RATIO: 0.43
BARBITURATES UR QL SCN>200 NG/ML: NEGATIVE
BASOPHILS # BLD AUTO: 0.03 K/UL
BASOPHILS NFR BLD: 0.4 %
BENZODIAZ UR QL SCN>200 NG/ML: NEGATIVE
BILIRUB SERPL-MCNC: 0.3 MG/DL
BNP SERPL-MCNC: 25 PG/ML
BUN SERPL-MCNC: 17 MG/DL
BZE UR QL SCN: NEGATIVE
CALCIUM SERPL-MCNC: 9.3 MG/DL
CANNABINOIDS UR QL SCN: NEGATIVE
CHLORIDE SERPL-SCNC: 104 MMOL/L
CO2 SERPL-SCNC: 23 MMOL/L
CREAT SERPL-MCNC: 0.8 MG/DL
CREAT UR-MCNC: 249.3 MG/DL
CV ECHO LV RWT: 0.46 CM
DIFFERENTIAL METHOD: ABNORMAL
DOP CALC AO PEAK VEL: 2.22 M/S
DOP CALC AO VTI: 41 CM
DOP CALC LVOT AREA: 3.8 CM2
DOP CALC LVOT DIAMETER: 2.2 CM
DOP CALC LVOT PEAK VEL: 0.95 M/S
DOP CALC LVOT STROKE VOLUME: 83.59 CM3
DOP CALCLVOT PEAK VEL VTI: 22 CM
E WAVE DECELERATION TIME: 203.8 MSEC
E/A RATIO: 0.8
ECHO LV POSTERIOR WALL: 1.08 CM (ref 0.6–1.1)
EOSINOPHIL # BLD AUTO: 0.3 K/UL
EOSINOPHIL NFR BLD: 4 %
ERYTHROCYTE [DISTWIDTH] IN BLOOD BY AUTOMATED COUNT: 16.1 %
EST. GFR  (AFRICAN AMERICAN): >60 ML/MIN/1.73 M^2
EST. GFR  (NON AFRICAN AMERICAN): >60 ML/MIN/1.73 M^2
ESTIMATED AVG GLUCOSE: 134 MG/DL
ETHANOL SERPL-MCNC: <10 MG/DL
FRACTIONAL SHORTENING: 29 % (ref 28–44)
GLUCOSE SERPL-MCNC: 110 MG/DL
HBA1C MFR BLD HPLC: 6.3 %
HCT VFR BLD AUTO: 39.2 %
HGB BLD-MCNC: 12 G/DL
INTERVENTRICULAR SEPTUM: 1.43 CM (ref 0.6–1.1)
LA MAJOR: 3.8 CM
LA MINOR: 4.46 CM
LA WIDTH: 3.64 CM
LEFT ATRIUM SIZE: 4.22 CM
LEFT ATRIUM VOLUME: 53.58 CM3
LEFT INTERNAL DIMENSION IN SYSTOLE: 3.32 CM (ref 2.1–4)
LEFT VENTRICLE DIASTOLIC VOLUME: 100.01 ML
LEFT VENTRICLE SYSTOLIC VOLUME: 44.83 ML
LEFT VENTRICULAR INTERNAL DIMENSION IN DIASTOLE: 4.65 CM (ref 3.5–6)
LEFT VENTRICULAR MASS: 222.34 G
LIPASE SERPL-CCNC: 26 U/L
LV LATERAL E/E' RATIO: 12.67
LYMPHOCYTES # BLD AUTO: 3 K/UL
LYMPHOCYTES NFR BLD: 37.4 %
MCH RBC QN AUTO: 25.3 PG
MCHC RBC AUTO-ENTMCNC: 30.6 G/DL
MCV RBC AUTO: 83 FL
METHADONE UR QL SCN>300 NG/ML: NEGATIVE
MONOCYTES # BLD AUTO: 0.8 K/UL
MONOCYTES NFR BLD: 9.9 %
MV PEAK A VEL: 0.95 M/S
MV PEAK E VEL: 0.76 M/S
NEUTROPHILS # BLD AUTO: 3.8 K/UL
NEUTROPHILS NFR BLD: 48 %
OPIATES UR QL SCN: NEGATIVE
PCP UR QL SCN>25 NG/ML: NEGATIVE
PISA TR MAX VEL: 1.96 M/S
PLATELET # BLD AUTO: 114 K/UL
PMV BLD AUTO: 9.6 FL
POTASSIUM SERPL-SCNC: 4.4 MMOL/L
PROT SERPL-MCNC: 6.4 G/DL
PULM VEIN S/D RATIO: 1.17
PV PEAK D VEL: 0.6 M/S
PV PEAK S VEL: 0.7 M/S
PV PEAK VELOCITY: 1.2 CM/S
RA MAJOR: 3.83 CM
RA PRESSURE: 3 MMHG
RBC # BLD AUTO: 4.75 M/UL
RIGHT VENTRICULAR END-DIASTOLIC DIMENSION: 2.56 CM
SODIUM SERPL-SCNC: 138 MMOL/L
TDI LATERAL: 0.06
TOXICOLOGY INFORMATION: NORMAL
TR MAX PG: 15.37 MMHG
TROPONIN I SERPL DL<=0.01 NG/ML-MCNC: 0.03 NG/ML
TROPONIN I SERPL DL<=0.01 NG/ML-MCNC: 0.04 NG/ML
TROPONIN I SERPL DL<=0.01 NG/ML-MCNC: 0.04 NG/ML
TV REST PULMONARY ARTERY PRESSURE: 18 MMHG
WBC # BLD AUTO: 7.91 K/UL

## 2019-02-14 PROCEDURE — 25500020 PHARM REV CODE 255: Performed by: INTERNAL MEDICINE

## 2019-02-14 PROCEDURE — 94761 N-INVAS EAR/PLS OXIMETRY MLT: CPT

## 2019-02-14 PROCEDURE — 63600175 PHARM REV CODE 636 W HCPCS: Performed by: EMERGENCY MEDICINE

## 2019-02-14 PROCEDURE — 93010 EKG 12-LEAD: ICD-10-PCS | Mod: ,,, | Performed by: INTERNAL MEDICINE

## 2019-02-14 PROCEDURE — 93010 ELECTROCARDIOGRAM REPORT: CPT | Mod: ,,, | Performed by: INTERNAL MEDICINE

## 2019-02-14 PROCEDURE — G0378 HOSPITAL OBSERVATION PER HR: HCPCS

## 2019-02-14 PROCEDURE — 93005 ELECTROCARDIOGRAM TRACING: CPT

## 2019-02-14 PROCEDURE — 80320 DRUG SCREEN QUANTALCOHOLS: CPT

## 2019-02-14 PROCEDURE — 84484 ASSAY OF TROPONIN QUANT: CPT | Mod: 91

## 2019-02-14 PROCEDURE — 36415 COLL VENOUS BLD VENIPUNCTURE: CPT

## 2019-02-14 PROCEDURE — 80307 DRUG TEST PRSMV CHEM ANLYZR: CPT

## 2019-02-14 PROCEDURE — 25000003 PHARM REV CODE 250: Performed by: EMERGENCY MEDICINE

## 2019-02-14 PROCEDURE — 25000003 PHARM REV CODE 250

## 2019-02-14 PROCEDURE — 83036 HEMOGLOBIN GLYCOSYLATED A1C: CPT

## 2019-02-14 RX ORDER — HEPARIN SODIUM 5000 [USP'U]/ML
5000 INJECTION, SOLUTION INTRAVENOUS; SUBCUTANEOUS EVERY 8 HOURS
Status: DISCONTINUED | OUTPATIENT
Start: 2019-02-14 | End: 2019-02-14 | Stop reason: HOSPADM

## 2019-02-14 RX ORDER — IPRATROPIUM BROMIDE AND ALBUTEROL SULFATE 2.5; .5 MG/3ML; MG/3ML
3 SOLUTION RESPIRATORY (INHALATION) EVERY 6 HOURS PRN
Status: DISCONTINUED | OUTPATIENT
Start: 2019-02-14 | End: 2019-02-14 | Stop reason: HOSPADM

## 2019-02-14 RX ORDER — IBUPROFEN 200 MG
16 TABLET ORAL
Status: DISCONTINUED | OUTPATIENT
Start: 2019-02-14 | End: 2019-02-14 | Stop reason: HOSPADM

## 2019-02-14 RX ORDER — GLUCAGON 1 MG
1 KIT INJECTION
Status: DISCONTINUED | OUTPATIENT
Start: 2019-02-14 | End: 2019-02-14 | Stop reason: HOSPADM

## 2019-02-14 RX ORDER — IBUPROFEN 200 MG
24 TABLET ORAL
Status: DISCONTINUED | OUTPATIENT
Start: 2019-02-14 | End: 2019-02-14 | Stop reason: HOSPADM

## 2019-02-14 RX ORDER — NITROGLYCERIN 0.4 MG/1
0.4 TABLET SUBLINGUAL
Status: COMPLETED | OUTPATIENT
Start: 2019-02-14 | End: 2019-02-14

## 2019-02-14 RX ORDER — ACETAMINOPHEN 500 MG
1000 TABLET ORAL
Status: COMPLETED | OUTPATIENT
Start: 2019-02-14 | End: 2019-02-14

## 2019-02-14 RX ORDER — HYDROCODONE BITARTRATE AND ACETAMINOPHEN 5; 325 MG/1; MG/1
1 TABLET ORAL EVERY 6 HOURS PRN
Status: DISCONTINUED | OUTPATIENT
Start: 2019-02-14 | End: 2019-02-14

## 2019-02-14 RX ADMIN — HYDROCODONE BITARTRATE AND ACETAMINOPHEN 1 TABLET: 5; 325 TABLET ORAL at 03:02

## 2019-02-14 RX ADMIN — ACETAMINOPHEN 1000 MG: 500 TABLET ORAL at 01:02

## 2019-02-14 RX ADMIN — NITROGLYCERIN 0.4 MG: 0.4 TABLET, ORALLY DISINTEGRATING SUBLINGUAL at 01:02

## 2019-02-14 RX ADMIN — IOHEXOL 100 ML: 350 INJECTION, SOLUTION INTRAVENOUS at 03:02

## 2019-02-14 NOTE — PLAN OF CARE
Pt is refusing echo until he gets pain medication.  [facility], bedside nurse is in the room speaking with him about importance of test and lab work.  He agrees to have tests done.  [place].  He states he wants to go to a shelter until his scheduled flight at the end of the month.  This  put name on white board and explained blue discharge folder to patient. Discharge planning brochure and/or business card given to patient.  Patient verbalized understanding.       02/14/19 1301   Discharge Assessment   Assessment Type Discharge Planning Assessment   Confirmed/corrected address and phone number on facesheet? Yes   Assessment information obtained from? Patient   Communicated expected length of stay with patient/caregiver yes   Prior to hospitilization cognitive status: Alert/Oriented;No Deficits   Prior to hospitalization functional status: Independent   Current cognitive status: Alert/Oriented;No Deficits   Current Functional Status: Independent   Lives With alone   Able to Return to Prior Arrangements other (see comments)   Is patient able to care for self after discharge? Yes   Patient's perception of discharge disposition other (comments)   Readmission Within the Last 30 Days no previous admission in last 30 days   Patient currently being followed by outpatient case management? No   Patient currently receives any other outside agency services? No   Equipment Currently Used at Home crutches   Do you have any problems affording any of your prescribed medications? No   Is the patient taking medications as prescribed? yes   Discharge Plan A Shelter;Other   Discharge Plan B Shelter   DME Needed Upon Discharge  none   Patient/Family in Agreement with Plan other (see comments)

## 2019-02-14 NOTE — PLAN OF CARE
Pt is discharged and requesting placement in a shelter.  Transportation will be provided to homeless [facility].  He is agreeable with discharge plans.       02/14/19 8872   Post-Acute Status   Post-Acute Authorization Placement

## 2019-02-14 NOTE — NURSING
"Patient is refusing morning labs. Nurse and phlebotomist explained importance of labs. Patient still refused stating "It's too early for labs." Nurse relayed patient's refusal to Dr. Patel. MD stated they will talk with him during morning rounds.    "

## 2019-02-14 NOTE — DISCHARGE INSTRUCTIONS
Chest Pain, Noncardiac (English) View Edit Remove   Chest Pain, Uncertain Cause (Child) (English) View Edit Remove

## 2019-02-14 NOTE — PLAN OF CARE
Pt to discharge to homeless shelter with transportation provided to facility by hospital.       02/14/19 1650   Final Note   Assessment Type Final Discharge Note   Hospital Follow Up  Appt(s) scheduled? No   Discharge plans and expectations educations in teach back method with documentation complete? Yes   Right Care Referral Info   Post Acute Recommendation Other

## 2019-02-14 NOTE — H&P
[facility] Medicine H&P Note     Admitting Team: [facility] Hospitalist Team A  Attending Physician: Cassandra Serra MD  Resident: Jermaine  Intern: Marina    Date of Admit: 2/13/2019    Chief Complaint     Chest Pain (chest pain x20 minutes after getting off plain. describes as stabbing and radiates to lt. side. pt. was given asa 324mg asa p.o. per ems and ntg. sl x1 with plane decrease from 10 to 9. )      Subjective:      History of Present Illness:  Antonio Gupta is a 63 y.o. white male who  has a past medical history of A-fib.. The patient presented to [facility] on 2/13/2019 with a primary complaint of Chest Pain (chest pain x20 minutes after getting off plain. describes as stabbing and radiates to lt. side. pt. was given asa 324mg asa p.o. per ems and ntg. sl x1 with plane decrease from 10 to 9. )    Patient reports to ED with chief complaint of chest pain.  Per patient, he had flew into town from [place] and about 2 hours after landing he began to experience chest pain.  Was brought in by EMS who administered  and 1 SL NTG which patient states provided some relief.  He describes the pain as a stabbing pain that intermittently radiates to the left side.  The pain has been fairly constant.  He is also complaining of some abdominal pain.  States that he has had multiple episodes of pancreatitis in the past.  Denies heavy alcohol consumption but did state that he drank some today.  Of note patient reports an extensive cardiac history.  States that he had an MI at age 51 and reports having a CABG.  Also states has history of Afib with RVR as well as WPW.    Of note, per chart review via Care Everywhere, patient has innumerable visits to EDs throughout [place], [place].  Most of them have been for complaint of chest pain.  Workups have always been negative.  Patient has history of homeless and it well documented in his chart that he is malingering and seeking pain meds.  Indeed, he is asking for  "pain meds now on this admission.    Past Medical History:  Past Medical History:   Diagnosis Date    A-fib        Past Surgical History:  No past surgical history on file.    Allergies:  Review of patient's allergies indicates:   Allergen Reactions    Nsaids (non-steroidal anti-inflammatory drug) Nausea And Vomiting     "vomit, spit up blood, blood in my stool"    Pt sates that he does not have allergy    Peas Other (See Comments)       Home Medications:  Prior to Admission medications    Not on File       Family History:  No family history on file.    Social History:  Social History     Tobacco Use    Smoking status: Not on file   Substance Use Topics    Alcohol use: Yes    Drug use: Not on file       Review of Systems:  Pertinent items are noted in HPI. All other systems are reviewed and are negative.    Health Maintaince :   Primary Care Physician: None    Immunizations:   TDap Unsure    Flu UTD  Pna Unknown    Cancer Screening:  Colonoscopy: Unknown     Objective:   Last 24 Hour Vital Signs:  BP  Min: 117/69  Max: 148/85  Temp  Av.5 °F (36.9 °C)  Min: 98.4 °F (36.9 °C)  Max: 98.7 °F (37.1 °C)  Pulse  Av.7  Min: 74  Max: 76  Resp  Av  Min: 14  Max: 18  SpO2  Av.7 %  Min: 94 %  Max: 97 %  There is no height or weight on file to calculate BMI.  No intake/output data recorded.    Physical Examination:  Gen: awake, alert, NAD  Eyes: sclera nonicteric, noninjected  HEENT: NC/AT, nasal septum midline, MMM, OP clear and without exudates  Lymph: no submandibular, submental, cervical, supraclavicular LAD  CV: RRR, no murmurs, midline surgical scar  Resp: CTAB, no wheezes or crackles, breathing comfortably on RA, equal, nonlabored breathing  Abd: soft, normoactive bowel sounds, mild TTP in epigastric region, ND, no guarding or rebound  Ext: +2 radial pulses, no C/C/E, warm to touch, <2 sec cap refill no TTP  Skin: intact, no lesions or rashes noted  Neuro: PERRL, moving all extremities  Psych: " appropriate affect    Laboratory:  Most Recent Data:  CBC:   Lab Results   Component Value Date    WBC 7.91 02/13/2019    HGB 12.0 (L) 02/13/2019    HCT 39.2 (L) 02/13/2019     (L) 02/13/2019    MCV 83 02/13/2019    RDW 16.1 (H) 02/13/2019     WBC Differential: 48 % N, 0 % Bands, 37 % L, 10 % M, 4 % Eo, 0.4 % Baso, no additional cells seen    BMP:   Lab Results   Component Value Date     02/13/2019    K 4.4 02/13/2019     02/13/2019    CO2 23 02/13/2019    BUN 17 02/13/2019    CREATININE 0.8 02/13/2019     02/13/2019    CALCIUM 9.3 02/13/2019    MG 2.0 12/27/2018    PHOS 3.2 12/27/2018     LFTs:   Lab Results   Component Value Date    PROT 6.4 02/13/2019    ALBUMIN 3.7 02/13/2019    BILITOT 0.3 02/13/2019    AST 25 02/13/2019    ALKPHOS 85 02/13/2019    ALT 18 02/13/2019     Coags:   Lab Results   Component Value Date    INR 1.0 12/27/2018     FLP: No results found for: CHOL, HDL, LDLCALC, TRIG, CHOLHDL  DM:   Lab Results   Component Value Date    CREATININE 0.8 02/13/2019     Thyroid: No results found for: TSH, FREET4, Z8KSIDR, W6ZOHLD, THYROIDAB  Anemia: No results found for: IRON, TIBC, FERRITIN, AUKQXYXE90, FOLATE  Cardiac:   Lab Results   Component Value Date    TROPONINI 0.034 (H) 02/13/2019    BNP 25 02/13/2019     Urinalysis:   Lab Results   Component Value Date    COLORU Yellow 12/27/2018    SPECGRAV 1.030 12/27/2018    NITRITE Negative 12/27/2018    KETONESU Trace (A) 12/27/2018    WBCUA 7 (H) 12/27/2018       Trended Lab Data:  Recent Labs   Lab 02/13/19  2352   WBC 7.91   HGB 12.0*   HCT 39.2*   *   MCV 83   RDW 16.1*      K 4.4      CO2 23   BUN 17   CREATININE 0.8      PROT 6.4   ALBUMIN 3.7   BILITOT 0.3   AST 25   ALKPHOS 85   ALT 18       Trended Cardiac Data:  Recent Labs   Lab 02/13/19  2352   TROPONINI 0.034*   BNP 25       Microbiology Data:  None    Other Results:  EKG (my interpretation): Sinus tachy, Left anterior fascicular block,  LVH    Radiology:  Imaging Results          X-Ray Chest AP Portable (Final result)  Result time 02/13/19 23:42:28    Final result by Elidia Cuevas MD (02/13/19 23:42:28)                 Impression:      Stable chronic findings.  No acute intrathoracic abnormality identified on this single radiographic view of the chest.      Electronically signed by: Elidia Cuevas MD  Date:    02/13/2019  Time:    23:42             Narrative:    EXAMINATION:  XR CHEST AP PORTABLE    CLINICAL HISTORY:  Chest Pain;    TECHNIQUE:  Single frontal view of the chest was performed.    COMPARISON:  12/27/2018    FINDINGS:  Cardiac monitoring leads overlie the chest.  There is postoperative change of prior median sternotomy.  Cardiomediastinal silhouette is stable.  The visualized airway is unremarkable.  There is slight elevation of the left hemidiaphragm.  There is left basilar subsegmental atelectasis.  No confluent focal region of consolidation, significant volume of pleural fluid or pneumothorax is identified.  The visualized osseous structures are intact without acute appreciable abnormality.                                 Assessment:     Antonio Gupta is a 63 y.o. male with:  Patient Active Problem List    Diagnosis Date Noted    Chest pain 02/14/2019        Plan:     Chest pain  - Pt with innumerable visits to the ED in [place] for similar complaints  - Does reportedly have rather extensive cardiac history with MI, CABG, AVR, WPW and history of Afib with RVR  - Initial troponin was 0.034  Will continue to trend along with EKGs  - Consider cardiology consult  Pt has had negative cath in 2017    Abdominal pain  - Patient states previously has had pancreatitis  Appears to be tender at epigastric region  - Lipase 26    Homelessness and Malingering  - Per chart review, patient has numerous visits to ED and has at times admitted he comes for shelter and food  Usually he complains of chest pain  Has been documented asking case  management what he needs to say to get admitted  - Also has well-documented history of pain med seeking behavior  - [facility] reports [facility] is a  [facility]    COPD  - Reports that he has COPD  Not currently taking meds  - Duo-nebs as needed    T2DM  - Previously documented history of insulin use but appears to have been stopped along with Metformin  - Recheck A1c    Diet: Cardiac  DVT PPx: HSQ  Code: Full      Code Status:     Full     Que Dean MD, PGY-2  [facility] Medicine      [facility] Medicine Hospitalist Pager numbers:   [facility] Hospitalist Medicine Team A (Cristhian/Ponce): [phone]  [facility] Hospitalist Medicine Team B (Nakita/Jeremy):  [phone]

## 2019-02-14 NOTE — PLAN OF CARE
Problem: Adult Inpatient Plan of Care  Goal: Plan of Care Review  Outcome: Ongoing (interventions implemented as appropriate)  Pt IV and tele box removed. Education on diet, medications and follow up care given. Pt verbalized understanding of dc instructions. No acute distress noted. Medications locked in safe returned to pt. No acute distress noted at time of dc. Transport set up to bring PT to shelter.

## 2019-02-14 NOTE — NURSING
Patient is refusing to answer admission questions. Nurse unable to fully complete admit due to it.

## 2019-02-14 NOTE — ED PROVIDER NOTES
"Encounter Date: 2/13/2019    SCRIBE #1 NOTE: I, Chastity Hernández, am scribing for, and in the presence of,  Dr. Portillo. I have scribed the entire note.       History     Chief Complaint   Patient presents with    Chest Pain     chest pain x20 minutes after getting off plain. describes as stabbing and radiates to lt. side. pt. was given asa 324mg asa p.o. per ems and ntg. sl x1 with plane decrease from 10 to 9.      Antonio Gupta is a 63 y.o. male who  has a past medical history of A-fib.    The patient presents to the ED due to chest pain. Patient flew in town today for [place], when he experienced chest pain 2 hours after landing. EMS administered 324 mg ASA and 1 SL NTG with relief. He describes "stabbing" chest pain with intermittent radiation to left side. No exacerbating factors noted. Additionally, patient complaining of abdominal pain with positive EtOH use today. Patient denies headache, fever, cough, congestion, nasal congestion, SOB, vomiting, diarrhea, numbness/tingling, weakness or any other concerning symptoms. Patient endorses extensive cardiac history including aortic stenosis, MI at age 51, and Jami-Parkinson-White syndrome.       The history is provided by the patient.     Review of patient's allergies indicates:  No Known Allergies  Past Medical History:   Diagnosis Date    A-fib      No past surgical history on file.  No family history on file.  Social History     Tobacco Use    Smoking status: Not on file   Substance Use Topics    Alcohol use: Yes    Drug use: Not on file     Review of Systems   Constitutional: Negative for chills and fever.   HENT: Negative for sore throat.    Respiratory: Negative for cough and shortness of breath.    Cardiovascular: Positive for chest pain.   Gastrointestinal: Positive for abdominal pain. Negative for nausea and vomiting.   Genitourinary: Negative for dysuria, frequency and urgency.   Musculoskeletal: Negative for back pain, neck pain and neck " stiffness.   Skin: Negative for rash and wound.   Neurological: Negative for syncope and weakness.   Hematological: Does not bruise/bleed easily.   Psychiatric/Behavioral: Negative for agitation, behavioral problems and confusion.       Physical Exam     Initial Vitals   BP Pulse Resp Temp SpO2   -- -- -- -- --      MAP       --         Physical Exam    Nursing note and vitals reviewed.  Constitutional: He appears well-developed and well-nourished. He is not diaphoretic. No distress.   HENT:   Head: Normocephalic and atraumatic.   Mouth/Throat: Oropharynx is clear and moist.   Eyes: EOM are normal. Pupils are equal, round, and reactive to light.   Neck: No tracheal deviation present.   Cardiovascular: Normal rate, regular rhythm, normal heart sounds and intact distal pulses.   Pulmonary/Chest: Breath sounds normal. No stridor. No respiratory distress.   Abdominal: Soft. He exhibits no distension and no mass. There is no tenderness.   Musculoskeletal: Normal range of motion. He exhibits no edema.   Neurological: He is alert and oriented to person, place, and time. No cranial nerve deficit or sensory deficit.   Skin: Skin is warm and dry. Capillary refill takes less than 2 seconds. No rash noted.   Psychiatric: He has a normal mood and affect. His behavior is normal. Thought content normal.         ED Course   Procedures  Labs Reviewed   CBC W/ AUTO DIFFERENTIAL - Abnormal; Notable for the following components:       Result Value    Hemoglobin 12.0 (*)     Hematocrit 39.2 (*)     MCH 25.3 (*)     MCHC 30.6 (*)     RDW 16.1 (*)     Platelets 114 (*)     All other components within normal limits   TROPONIN I - Abnormal; Notable for the following components:    Troponin I 0.034 (*)     All other components within normal limits   COMPREHENSIVE METABOLIC PANEL   B-TYPE NATRIURETIC PEPTIDE   LIPASE   ALCOHOL,MEDICAL (ETHANOL)   DRUG SCREEN PANEL, URINE EMERGENCY   LIPASE   ALCOHOL,MEDICAL (ETHANOL)     EKG Readings:  (Independently Interpreted)   Rate 80, NSR. Nonspecific T wave abnormality. OH interval 120.            X-Rays:   Independently Interpreted Readings:   Other Readings:  Reviewed by myself, read by radiology.      Imaging Results          X-Ray Chest AP Portable (Final result)  Result time 02/13/19 23:42:28    Final result by Elidia Cuevas MD (02/13/19 23:42:28)                 Impression:      Stable chronic findings.  No acute intrathoracic abnormality identified on this single radiographic view of the chest.      Electronically signed by: Elidia Cuevas MD  Date:    02/13/2019  Time:    23:42             Narrative:    EXAMINATION:  XR CHEST AP PORTABLE    CLINICAL HISTORY:  Chest Pain;    TECHNIQUE:  Single frontal view of the chest was performed.    COMPARISON:  12/27/2018    FINDINGS:  Cardiac monitoring leads overlie the chest.  There is postoperative change of prior median sternotomy.  Cardiomediastinal silhouette is stable.  The visualized airway is unremarkable.  There is slight elevation of the left hemidiaphragm.  There is left basilar subsegmental atelectasis.  No confluent focal region of consolidation, significant volume of pleural fluid or pneumothorax is identified.  The visualized osseous structures are intact without acute appreciable abnormality.                               Medical Decision Making:   Differential Diagnosis:   Differential Diagnosis includes, but is not limited to:  ACS/MI, PE, aortic dissection, pneumothorax, cardiac tamponade, pericarditis/myocarditis, pneumonia, infection/abscess, lung mass, trauma/fracture, costochondritis/pleurisy, MSK pain/contusion, GERD, biliary disease, pancreatitis, anemia    Clinical Tests:   Lab Tests: Reviewed  Radiological Study: Ordered and Reviewed  Medical Tests: Reviewed and Ordered    Additional MDM:   Heart Score:    History:          Slightly suspicious.  ECG:             Nonspecific repolarisation disturbance  Age:               45-65  years  Risk factors: 1-2 risk factors  Troponin:       1-2x normal limit  Final Score: 4                       Clinical Impression:     1. Chest pain      Disposition:   Disposition: Placed in Observation  Condition: Stable       Scribe Attestation I, Krish Portillo,  personally performed the services described in this documentation. All medical record entries made by the scribe were at my direction and in my presence.  I have reviewed the chart and agree that the record reflects my personal performance and is accurate and complete. Krish Portillo M.D. 11:43 AM02/16/2019                    Krish Portillo Jr., MD  02/16/19 1143

## 2019-02-15 NOTE — DISCHARGE SUMMARY
[facility] Hospital Medicine Discharge Summary    Primary Team: [facility] Hospitalist Team A  Attending Physician: Dr. Serra  Resident: Jermaine  Intern: Marina    Date of Admit: 2/13/2019  Date of Discharge: 2/14/2019    Discharge to: Shelter  Condition: Stable    Discharge Diagnoses     Patient Active Problem List   Diagnosis    Chest pain in adult    Generalized abdominal pain    Centrilobular emphysema    Type 2 diabetes mellitus without complication, without long-term current use of insulin    Elevated troponin       Consultants and Procedures     Consultants:  None    Procedures:   None    Imaging:  CTA: No PE appreciated.     CT head: no acute findings    CXR: Stable chronic findings.    Brief History of Present Illness      Patient to ED with chief complaint of chest pain.  Per patient, he had flew into town from [place] and about 2 hours after landing he began to experience chest pain.  Was brought in by EMS who administered  and 1 SL NTG which patient stated provided some relief.  He described the pain as a stabbing pain that intermittently radiated to the left side.  The pain had been fairly constant..  Of note patient reported an extensive cardiac history.  States that he had an MI at age 51 and reported having a CABG.  Also stated has history of Afib with RVR as well as WPW.     Of note, per chart review via Care Everywhere, patient has innumerable visits to EDs throughout [place], [place].  Most of them have been for complaint of chest pain.  Workups have always been negative.  Patient has history of homeless and it well documented in his chart that he is malingering and seeking pain meds.  Indeed, he is asking for pain meds now on this admission.        For the full HPI please refer to the History & Physical from this admission.    Hospital Course By Problem with Pertinent Findings     Chest pain  - Pt with innumerable visits to the ED in [place] for similar complaints  - Does reportedly have rather  "extensive cardiac history with MI, CABG, AVR, WPW and history of Afib with RVR  - Echo with no acute concerns  - CTA with no acute concerns     Abdominal pain  - Patient states previously has had pancreatitis  Appears to be tender at epigastric region  - Lipase 26     Homelessness and Malingering  - Per chart review, patient has numerous visits to ED and has at times admitted he comes for shelter and food  Usually he complains of chest pain  Has been documented asking case management what he needs to say to get admitted  - Also has well-documented history of pain med seeking behavior  - Patient reports he is a [place] [facility]  - Patient continually changes story; states this Dr told him he could get morphine and why cant he get morphine. Patient went on a tirade about not receiving pain medicine.   Patient informed he would not receive pain medicine as there was no indication. Patient was very frustrated about not receiving pain medication. Began trying to make deals such as "how about just IV Toradol"  -Patient discharged with suspected reason for coming in to be  malingering       COPD  - Reports that he has COPD  Not currently taking meds  - Duo-nebs as needed     T2DM  - Previously documented history of insulin use but appears to have been stopped along with Metformin  - Patient stated that he took his insulin with him on this trip        Discharge Medications     There are no discharge medications for this patient.     Patient states that he is unable to afford medications; as some medications with him. Advised patient to go to [facility] or a nearby facility to help with acquiring medication while in [place].    Discharge Information:   Diet:  Insulin    Physical Activity:  As tolerated             Follow-Up Appointments:  Patient advised to find a PCP at [facility] or a similar place.     Tushar Gray, DO  [facility] Internal Medicine, [facility]            "

## 2019-02-19 ENCOUNTER — HOSPITAL ENCOUNTER (EMERGENCY)
Facility: OTHER | Age: 64
Discharge: HOME | End: 2019-02-19
Attending: EMERGENCY MEDICINE
Payer: MEDICARE

## 2019-02-19 VITALS
HEART RATE: 82 BPM | TEMPERATURE: 98 F | WEIGHT: 190 LBS | OXYGEN SATURATION: 95 % | HEIGHT: 68 IN | RESPIRATION RATE: 17 BRPM | BODY MASS INDEX: 28.79 KG/M2 | SYSTOLIC BLOOD PRESSURE: 173 MMHG | DIASTOLIC BLOOD PRESSURE: 78 MMHG

## 2019-02-19 DIAGNOSIS — R07.9 CHEST PAIN: Primary | ICD-10-CM

## 2019-02-19 DIAGNOSIS — M53.3 SACROCOCCYGEAL PAIN: ICD-10-CM

## 2019-02-19 LAB
ALBUMIN SERPL BCP-MCNC: 3.6 G/DL
ALP SERPL-CCNC: 88 U/L
ALT SERPL W/O P-5'-P-CCNC: 16 U/L
ANION GAP SERPL CALC-SCNC: 10 MMOL/L
AST SERPL-CCNC: 19 U/L
BASOPHILS # BLD AUTO: 0.04 K/UL
BASOPHILS NFR BLD: 0.5 %
BILIRUB SERPL-MCNC: 0.2 MG/DL
BNP SERPL-MCNC: 72 PG/ML
BUN SERPL-MCNC: 26 MG/DL
CALCIUM SERPL-MCNC: 9.6 MG/DL
CHLORIDE SERPL-SCNC: 105 MMOL/L
CO2 SERPL-SCNC: 24 MMOL/L
CREAT SERPL-MCNC: 0.8 MG/DL
DIFFERENTIAL METHOD: ABNORMAL
EOSINOPHIL # BLD AUTO: 0.3 K/UL
EOSINOPHIL NFR BLD: 3.6 %
ERYTHROCYTE [DISTWIDTH] IN BLOOD BY AUTOMATED COUNT: 16.5 %
EST. GFR  (AFRICAN AMERICAN): >60 ML/MIN/1.73 M^2
EST. GFR  (NON AFRICAN AMERICAN): >60 ML/MIN/1.73 M^2
GLUCOSE SERPL-MCNC: 113 MG/DL
HCT VFR BLD AUTO: 36.6 %
HGB BLD-MCNC: 11.4 G/DL
LIPASE SERPL-CCNC: 36 U/L
LYMPHOCYTES # BLD AUTO: 2.3 K/UL
LYMPHOCYTES NFR BLD: 27.1 %
MCH RBC QN AUTO: 25.9 PG
MCHC RBC AUTO-ENTMCNC: 31.1 G/DL
MCV RBC AUTO: 83 FL
MONOCYTES # BLD AUTO: 1.1 K/UL
MONOCYTES NFR BLD: 12.9 %
NEUTROPHILS # BLD AUTO: 4.7 K/UL
NEUTROPHILS NFR BLD: 55.7 %
PLATELET # BLD AUTO: 123 K/UL
PMV BLD AUTO: 9 FL
POTASSIUM SERPL-SCNC: 4.6 MMOL/L
PROT SERPL-MCNC: 6.1 G/DL
RBC # BLD AUTO: 4.4 M/UL
SODIUM SERPL-SCNC: 139 MMOL/L
TROPONIN I SERPL DL<=0.01 NG/ML-MCNC: 0.03 NG/ML
TROPONIN I SERPL DL<=0.01 NG/ML-MCNC: 0.03 NG/ML
WBC # BLD AUTO: 8.52 K/UL

## 2019-02-19 PROCEDURE — 93005 ELECTROCARDIOGRAM TRACING: CPT

## 2019-02-19 PROCEDURE — 93010 EKG 12-LEAD: ICD-10-PCS | Mod: ,,, | Performed by: INTERNAL MEDICINE

## 2019-02-19 PROCEDURE — 85025 COMPLETE CBC W/AUTO DIFF WBC: CPT

## 2019-02-19 PROCEDURE — 36010 PLACE CATHETER IN VEIN: CPT

## 2019-02-19 PROCEDURE — 80053 COMPREHEN METABOLIC PANEL: CPT

## 2019-02-19 PROCEDURE — 36410 VNPNXR 3YR/> PHY/QHP DX/THER: CPT

## 2019-02-19 PROCEDURE — 84484 ASSAY OF TROPONIN QUANT: CPT

## 2019-02-19 PROCEDURE — 99285 EMERGENCY DEPT VISIT HI MDM: CPT | Mod: 25

## 2019-02-19 PROCEDURE — 93010 ELECTROCARDIOGRAM REPORT: CPT | Mod: ,,, | Performed by: INTERNAL MEDICINE

## 2019-02-19 PROCEDURE — 83880 ASSAY OF NATRIURETIC PEPTIDE: CPT

## 2019-02-19 PROCEDURE — 83690 ASSAY OF LIPASE: CPT

## 2019-02-19 NOTE — ED TRIAGE NOTES
"Pt arrives to ED with c/o chest pain with onset Wednesday. Pt states "I have chest pain on and off since Wednesday. I had a bout of pancreatitis, I believe I still have it. I have chest pain currently, it started on a plane, 8/10, it feels burning. Time helps, the pain last hours if not days. Could I please have 2 liters of oxygen for comfort because I wear oxygen at home. I have belly pain 8/10, I have nausea. I have been drinking intermittently. I have been having back pain since I fell on my back today and they didn't do anything for me. 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 I have neck pain. Do you want to know where I was when I fell in the shower? I was at [facility] and fell in the shower." pt lying in bed, respirations even, unlabored, eyes open spontaneously, NAD noted, answering questions appropriately. Pt placed on BP cycling, pulse ox, and cardiac monitoring  "

## 2019-02-19 NOTE — ED NOTES
"While in room to DC pt, pt able to ambulate in room without crutches with steady gait to urinate and dress self. Security outside room to escort pt out of facility. Pt using foul, racist and homophobic language stating "I hate niggers" referring to security personnel, "and that faggot in Xray telling me to turn my hips, I think he had secret designs to fuck me up the ass" and "I'm gonna file a fucking lawsuit, for harassing a fucking cripple." Pt constantly asking for things like valentino, bandaids, wet wipes bus tickets and more. Pt able to dress self and gather belongings independently with stand-by assist from RN, pt requested WC to leave facility, able to ambulate to  and sat down without incident. Pt provided DC instructions, DC paperwork, bus token and escorted off campus by security personnel safely and without injury. Pt AAOx4, responding appropriately to questions, speaking in full sentences without slurred speech, no nystagmus noted, respirations even and unlabored with equal bilateral chest expansion, skin pink warm and dry, pt appears in NAD.  "

## 2019-02-19 NOTE — ED NOTES
Pt lying in bed, respirations even, unlabored, eyes open spontaneously, NAD noted, call bell within reach. Pt updated on plan of care, will continue to monitor

## 2019-02-19 NOTE — ED NOTES
MD to bedside discussing DC instructions with pt. Pt requesting more pain medications and another hour because he has not slept. Security called to ensure safety of pt and staff at DC.

## 2019-02-19 NOTE — ED NOTES
"Pt given blanket per pt request. pt states "get me an order for IV tramadol and zofran for nausea, it must be IV" MD aware  "

## 2019-02-19 NOTE — ED NOTES
"Pt yelling at staff standing out in the middle of the hallway without crutches, walking with steady gait, pt ripped off EKG leads and threw individual EKG stickers on ground. Pt at end of bed yelling at staff. Pt is yelling "I was brought back from xray with that [employer] and no one gave me a call light and didn't put my oxygen on!" Pt is told that he does not need to curse at staff, and is given a warning to not yell at staff again. Pt states "I came here to get into a veterans program. Ill start cooperating, I promise, can you please hook my back up." Pt placed back on cardiac monitoring, call bell in bed with pt, BP cycling, pulse ox.  "

## 2019-02-19 NOTE — ED PROVIDER NOTES
"Encounter Date: 2/19/2019    SCRIBE #1 NOTE: I, Lian Gee, am scribing for, and in the presence of, Dr. Russo.       History     Chief Complaint   Patient presents with    Chest Pain     Pt CO chest neck and back pain since last Wednesday.      Time seen by provider: 3:14 AM    This is a 63 y.o. male who presents with multiple complaints, chief complaint of chest pain. Patient reports intermittent chest pain that began five days ago. He describes chest pain as burning. He reports left sided chest pain rated as an 8 out 10 in severity.     He reports fall in the shower that occurred today at [facility]. He reports fall caused worsening of his chronic low back pain. He denies head injury or loss of consciousness.     Additionally, he reports generalized abdominal pain. He rates pain as an 8 out of 10. He reports a history of pancreatis. He states current abdominal pain feels similar to previous episodes of pancreatitis. He reports nausea. He admits to alcohol use. He acknowledges consuming alcohol prior to arrival. He denies fever, chills, cough, shortness of breath, vomiting, diarrhea, numbness, or weakness. Patient is a poor historian with very tangential thought process.       The history is provided by the patient.     Review of patient's allergies indicates:   Allergen Reactions    Nsaids (non-steroidal anti-inflammatory drug) Nausea And Vomiting     "vomit, spit up blood, blood in my stool"    Pt sates that he does not have allergy    Peas Other (See Comments)     Past Medical History:   Diagnosis Date    A-fib      History reviewed. No pertinent surgical history.  History reviewed. No pertinent family history.  Social History     Tobacco Use    Smoking status: Never Smoker   Substance Use Topics    Alcohol use: Yes    Drug use: No     Review of Systems   Constitutional: Negative for chills and fever.   HENT: Negative for congestion and sore throat.    Eyes: Negative for visual disturbance.   Respiratory: " Negative for cough and shortness of breath.    Cardiovascular: Positive for chest pain. Negative for palpitations.   Gastrointestinal: Positive for abdominal pain and nausea. Negative for diarrhea and vomiting.   Genitourinary: Negative for decreased urine volume, dysuria and frequency.   Musculoskeletal: Positive for back pain. Negative for joint swelling, neck pain and neck stiffness.   Skin: Negative for rash and wound.   Neurological: Negative for weakness, numbness and headaches.   Psychiatric/Behavioral: Negative for behavioral problems and confusion.       Physical Exam     Initial Vitals [02/19/19 0259]   BP Pulse Resp Temp SpO2   (!) 142/80 94 18 98.1 °F (36.7 °C) 97 %      MAP       --         Physical Exam    Nursing note and vitals reviewed.  Constitutional: He appears well-developed and well-nourished. No distress.   HENT:   Head: Normocephalic and atraumatic.   Mouth/Throat: Oropharynx is clear and moist.   Eyes: Conjunctivae and EOM are normal. Pupils are equal, round, and reactive to light.   Neck: Normal range of motion. Neck supple.   Cardiovascular: Normal rate, regular rhythm and normal heart sounds. Exam reveals no gallop and no friction rub.    No murmur heard.  Pulmonary/Chest: Breath sounds normal. No respiratory distress. He has no wheezes. He has no rhonchi. He has no rales.   Abdominal: Soft. There is no tenderness. There is no rebound and no guarding.   Musculoskeletal: Normal range of motion. He exhibits tenderness (Tenderness to palpation of the sacrum without overlying skin changes. ). He exhibits no edema.   Neurological: He is alert and oriented to person, place, and time. He has normal strength. No sensory deficit.   Skin: Skin is warm and dry.   Psychiatric: His behavior is normal. His affect is labile. His speech is tangential.         ED Course   External Jugular IV  Date/Time: 2/19/2019 3:38 AM  Performed by: Nayeli Russo MD  Authorized by: Nayeli Russo MD   Location  (Ext Jugular): Right.  Area Prepped With: Chlorohexidine.  Catheter Size: 18 ga.  Catheter Type: Jelco.  Number of attempts: 1  Fixation/Dressing: Tegaderm.  Patient tolerance: Patient tolerated the procedure well with no immediate complications        Labs Reviewed   CBC W/ AUTO DIFFERENTIAL - Abnormal; Notable for the following components:       Result Value    RBC 4.40 (*)     Hemoglobin 11.4 (*)     Hematocrit 36.6 (*)     MCH 25.9 (*)     MCHC 31.1 (*)     RDW 16.5 (*)     Platelets 123 (*)     MPV 9.0 (*)     Mono # 1.1 (*)     All other components within normal limits   COMPREHENSIVE METABOLIC PANEL - Abnormal; Notable for the following components:    Glucose 113 (*)     BUN, Bld 26 (*)     All other components within normal limits   TROPONIN I - Abnormal; Notable for the following components:    Troponin I 0.032 (*)     All other components within normal limits   TROPONIN I - Abnormal; Notable for the following components:    Troponin I 0.027 (*)     All other components within normal limits   B-TYPE NATRIURETIC PEPTIDE   LIPASE     EKG Readings: (Independently Interpreted)   Normal sinus rhythm at a rate of 94 bpm. No STEMI. Motion artifact present. Similar morphology in previous to 2/14/19.        Imaging Results          X-Ray Sacrum And Coccyx (Final result)  Result time 02/19/19 04:27:17    Final result by Elidia Cuevas MD (02/19/19 04:27:17)                 Impression:      As above.      Electronically signed by: Elidia Cuevas MD  Date:    02/19/2019  Time:    04:27             Narrative:    EXAMINATION:  XR SACRUM AND COCCYX    CLINICAL HISTORY:  Sacrococcygeal disorders, not elsewhere classified    TECHNIQUE:  Two or three views of the sacrum and coccyx were performed.    COMPARISON:  Correlation is made to CT abdomen and pelvis 12/27/2018    FINDINGS:  The sacroiliac joints are intact.  No evidence of diastasis.  There is chronic appearing deformity of the sacrococcygeal junction, unchanged from  prior CT examination.  There is degenerative change at the L5-S1 level.                               X-Ray Chest AP Portable (Final result)  Result time 02/19/19 04:28:49    Final result by Elidia Cuevas MD (02/19/19 04:28:49)                 Impression:      No acute intrathoracic abnormality or significant interval detrimental change identified on this single radiographic view of the chest.      Electronically signed by: Elidia Cuevas MD  Date:    02/19/2019  Time:    04:28             Narrative:    EXAMINATION:  XR CHEST AP PORTABLE    CLINICAL HISTORY:  Chest Pain;    TECHNIQUE:  Single frontal view of the chest was performed.    COMPARISON:  02/13/2019    FINDINGS:  There is postoperative change of prior median sternotomy.  The cardiomediastinal silhouette is stable.  The visualized airway is unremarkable.  There is elevation of the left hemidiaphragm with left basilar subsegmental atelectasis.  No focal consolidation, large pleural effusion or pneumothorax is appreciated.Visualized osseous structures demonstrate no acute abnormality.                              X-Rays:   Independently Interpreted Readings:   Chest X-Ray: Cardiomegaly. Sternotomy wires in place. Studies limited by patient rotation. No large in infiltrate, effusion, or pneumothorax.    Other Readings:  X-Ray Sacrum and Coccyx: Will defer to radiology reading.     Medical Decision Making:   Independently Interpreted Test(s):   I have ordered and independently interpreted X-rays - see prior notes.  I have ordered and independently interpreted EKG Reading(s) - see prior notes  Clinical Tests:   Lab Tests: Ordered and Reviewed  Radiological Study: Ordered and Reviewed  Medical Tests: Ordered and Reviewed  ED Management:  Emergent evaluation of 63-year-old male who presents with multiple complaints.    He has history of cardiac disease and complains of chest pain.  There is no acute ischemic change on EKG.  Chest x-ray is clear.  Will plan for  troponin x 2 sets.  Initial troponin is mildly elevated, it appears patient's new baseline is slightly elevated.  As long as there is no large delta for the repeat, I will recommend discharge home.    Patient also complained of low back pain exacerbation after a fall.  X-ray shows no acute change, and there is no evidence of cauda equina syndrome or cord compression by history or exam.    Additional complaint included possible pancreatitis exacerbation.  On exam is abdomen soft and nontender and lipase level is within normal limits.  Patient will be discharged by day-shift physician pending repeat troponin.  Medical record was reviewed at length, patient was just admitted and discharged with similar complaints a few days ago.  He has many frequent ER visits, but is high risk given his known cardiac disease.  He is homeless and demanding.  He is given a list of local shelters and [facility]            Scribe Attestation:   Scribe #1: I performed the above scribed service and the documentation accurately describes the services I performed. I attest to the accuracy of the note.    Attending Attestation:           Physician Attestation for Scribe:  Physician Attestation Statement for Scribe #1: I, Dr. Russo, reviewed documentation, as scribed by Lian Gee in my presence, and it is both accurate and complete.                    Clinical Impression:     1. Chest pain    2. Sacrococcygeal pain                                   Nayeli Russo MD  02/19/19 [id number]

## 2019-03-09 ENCOUNTER — HOSPITAL ENCOUNTER (EMERGENCY)
Facility: HOSPITAL | Age: 64
Discharge: HOME | End: 2019-03-09
Attending: EMERGENCY MEDICINE
Payer: MEDICARE

## 2019-03-09 VITALS
WEIGHT: 190.06 LBS | HEART RATE: 94 BPM | DIASTOLIC BLOOD PRESSURE: 71 MMHG | OXYGEN SATURATION: 96 % | TEMPERATURE: 99 F | HEIGHT: 68 IN | BODY MASS INDEX: 28.8 KG/M2 | RESPIRATION RATE: 18 BRPM | SYSTOLIC BLOOD PRESSURE: 134 MMHG

## 2019-03-09 DIAGNOSIS — R07.9 CHEST PAIN: ICD-10-CM

## 2019-03-09 DIAGNOSIS — R10.13 EPIGASTRIC BURNING SENSATION: ICD-10-CM

## 2019-03-09 DIAGNOSIS — K85.90 ACUTE PANCREATITIS, UNSPECIFIED COMPLICATION STATUS, UNSPECIFIED PANCREATITIS TYPE: Primary | ICD-10-CM

## 2019-03-09 LAB
ALBUMIN SERPL BCP-MCNC: 3.8 G/DL
ALP SERPL-CCNC: 88 U/L
ALT SERPL W/O P-5'-P-CCNC: 15 U/L
ANION GAP SERPL CALC-SCNC: 10 MMOL/L
AST SERPL-CCNC: 18 U/L
BACTERIA #/AREA URNS AUTO: ABNORMAL /HPF
BASOPHILS # BLD AUTO: 0.05 K/UL
BASOPHILS NFR BLD: 0.7 %
BILIRUB SERPL-MCNC: 0.2 MG/DL
BILIRUB UR QL STRIP: NEGATIVE
BNP SERPL-MCNC: 31 PG/ML
BUN SERPL-MCNC: 19 MG/DL
CALCIUM SERPL-MCNC: 9.5 MG/DL
CHLORIDE SERPL-SCNC: 105 MMOL/L
CLARITY UR REFRACT.AUTO: CLEAR
CO2 SERPL-SCNC: 22 MMOL/L
COLOR UR AUTO: YELLOW
CREAT SERPL-MCNC: 0.8 MG/DL
DIFFERENTIAL METHOD: ABNORMAL
EOSINOPHIL # BLD AUTO: 0.3 K/UL
EOSINOPHIL NFR BLD: 4.5 %
ERYTHROCYTE [DISTWIDTH] IN BLOOD BY AUTOMATED COUNT: 16.2 %
EST. GFR  (AFRICAN AMERICAN): >60 ML/MIN/1.73 M^2
EST. GFR  (NON AFRICAN AMERICAN): >60 ML/MIN/1.73 M^2
GLUCOSE SERPL-MCNC: 150 MG/DL
GLUCOSE UR QL STRIP: ABNORMAL
HCT VFR BLD AUTO: 40.5 %
HGB BLD-MCNC: 12 G/DL
HGB UR QL STRIP: ABNORMAL
IMM GRANULOCYTES # BLD AUTO: 0.02 K/UL
IMM GRANULOCYTES NFR BLD AUTO: 0.3 %
KETONES UR QL STRIP: ABNORMAL
LEUKOCYTE ESTERASE UR QL STRIP: NEGATIVE
LIPASE SERPL-CCNC: 93 U/L
LYMPHOCYTES # BLD AUTO: 2.1 K/UL
LYMPHOCYTES NFR BLD: 29.4 %
MCH RBC QN AUTO: 25.9 PG
MCHC RBC AUTO-ENTMCNC: 29.6 G/DL
MCV RBC AUTO: 88 FL
MICROSCOPIC COMMENT: ABNORMAL
MONOCYTES # BLD AUTO: 0.8 K/UL
MONOCYTES NFR BLD: 11.1 %
NEUTROPHILS # BLD AUTO: 3.8 K/UL
NEUTROPHILS NFR BLD: 54 %
NITRITE UR QL STRIP: NEGATIVE
NRBC BLD-RTO: 0 /100 WBC
PH UR STRIP: 5 [PH] (ref 5–8)
PLATELET # BLD AUTO: 123 K/UL
PMV BLD AUTO: 9.1 FL
POTASSIUM SERPL-SCNC: 4.1 MMOL/L
PROT SERPL-MCNC: 6.5 G/DL
PROT UR QL STRIP: NEGATIVE
RBC # BLD AUTO: 4.63 M/UL
RBC #/AREA URNS AUTO: 39 /HPF (ref 0–4)
SODIUM SERPL-SCNC: 137 MMOL/L
SP GR UR STRIP: 1.03 (ref 1–1.03)
TROPONIN I SERPL DL<=0.01 NG/ML-MCNC: 0.02 NG/ML
URN SPEC COLLECT METH UR: ABNORMAL
WBC # BLD AUTO: 7.05 K/UL
WBC #/AREA URNS AUTO: 2 /HPF (ref 0–5)

## 2019-03-09 PROCEDURE — 93010 EKG 12-LEAD: ICD-10-PCS | Mod: ,,, | Performed by: INTERNAL MEDICINE

## 2019-03-09 PROCEDURE — S0028 INJECTION, FAMOTIDINE, 20 MG: HCPCS | Performed by: STUDENT IN AN ORGANIZED HEALTH CARE EDUCATION/TRAINING PROGRAM

## 2019-03-09 PROCEDURE — 99285 EMERGENCY DEPT VISIT HI MDM: CPT | Mod: 25

## 2019-03-09 PROCEDURE — 96375 TX/PRO/DX INJ NEW DRUG ADDON: CPT

## 2019-03-09 PROCEDURE — 80053 COMPREHEN METABOLIC PANEL: CPT

## 2019-03-09 PROCEDURE — 85025 COMPLETE CBC W/AUTO DIFF WBC: CPT

## 2019-03-09 PROCEDURE — 83880 ASSAY OF NATRIURETIC PEPTIDE: CPT

## 2019-03-09 PROCEDURE — 93010 ELECTROCARDIOGRAM REPORT: CPT | Mod: ,,, | Performed by: INTERNAL MEDICINE

## 2019-03-09 PROCEDURE — 63600175 PHARM REV CODE 636 W HCPCS: Performed by: STUDENT IN AN ORGANIZED HEALTH CARE EDUCATION/TRAINING PROGRAM

## 2019-03-09 PROCEDURE — 25000003 PHARM REV CODE 250: Performed by: STUDENT IN AN ORGANIZED HEALTH CARE EDUCATION/TRAINING PROGRAM

## 2019-03-09 PROCEDURE — 93005 ELECTROCARDIOGRAM TRACING: CPT

## 2019-03-09 PROCEDURE — 96374 THER/PROPH/DIAG INJ IV PUSH: CPT

## 2019-03-09 PROCEDURE — 99284 EMERGENCY DEPT VISIT MOD MDM: CPT | Mod: ,,, | Performed by: EMERGENCY MEDICINE

## 2019-03-09 PROCEDURE — 99284 PR EMERGENCY DEPT VISIT,LEVEL IV: ICD-10-PCS | Mod: ,,, | Performed by: EMERGENCY MEDICINE

## 2019-03-09 PROCEDURE — 83690 ASSAY OF LIPASE: CPT

## 2019-03-09 PROCEDURE — 63600175 PHARM REV CODE 636 W HCPCS: Performed by: EMERGENCY MEDICINE

## 2019-03-09 PROCEDURE — 81001 URINALYSIS AUTO W/SCOPE: CPT

## 2019-03-09 PROCEDURE — 84484 ASSAY OF TROPONIN QUANT: CPT

## 2019-03-09 RX ORDER — OXYCODONE AND ACETAMINOPHEN 10; 325 MG/1; MG/1
1 TABLET ORAL EVERY 4 HOURS PRN
COMMUNITY

## 2019-03-09 RX ORDER — METOPROLOL TARTRATE 100 MG/1
100 TABLET ORAL 2 TIMES DAILY
COMMUNITY

## 2019-03-09 RX ORDER — ONDANSETRON 2 MG/ML
4 INJECTION INTRAMUSCULAR; INTRAVENOUS
Status: COMPLETED | OUTPATIENT
Start: 2019-03-09 | End: 2019-03-09

## 2019-03-09 RX ORDER — ONDANSETRON 4 MG/1
4 TABLET, FILM COATED ORAL EVERY 6 HOURS
Qty: 20 TABLET | Refills: 0 | Status: SHIPPED | OUTPATIENT
Start: 2019-03-09

## 2019-03-09 RX ORDER — INSULIN ASPART 100 [IU]/ML
INJECTION, SUSPENSION SUBCUTANEOUS
COMMUNITY

## 2019-03-09 RX ORDER — ATORVASTATIN CALCIUM 10 MG/1
10 TABLET, FILM COATED ORAL DAILY
COMMUNITY

## 2019-03-09 RX ORDER — GABAPENTIN 100 MG/1
100 CAPSULE ORAL 3 TIMES DAILY
COMMUNITY

## 2019-03-09 RX ORDER — ACETAMINOPHEN 325 MG/1
650 TABLET ORAL
Status: DISCONTINUED | OUTPATIENT
Start: 2019-03-09 | End: 2019-03-09

## 2019-03-09 RX ORDER — ISOSORBIDE MONONITRATE 30 MG/1
30 TABLET, EXTENDED RELEASE ORAL DAILY
COMMUNITY

## 2019-03-09 RX ORDER — KETOROLAC TROMETHAMINE 30 MG/ML
10 INJECTION, SOLUTION INTRAMUSCULAR; INTRAVENOUS
Status: COMPLETED | OUTPATIENT
Start: 2019-03-09 | End: 2019-03-09

## 2019-03-09 RX ORDER — METHOCARBAMOL 500 MG/1
500 TABLET, FILM COATED ORAL 2 TIMES DAILY
Qty: 10 TABLET | Refills: 0 | Status: SHIPPED | OUTPATIENT
Start: 2019-03-09 | End: 2019-03-14

## 2019-03-09 RX ORDER — FAMOTIDINE 10 MG/ML
20 INJECTION INTRAVENOUS
Status: COMPLETED | OUTPATIENT
Start: 2019-03-09 | End: 2019-03-09

## 2019-03-09 RX ORDER — CLOPIDOGREL BISULFATE 75 MG/1
75 TABLET ORAL DAILY
COMMUNITY

## 2019-03-09 RX ADMIN — KETOROLAC TROMETHAMINE 10 MG: 30 INJECTION, SOLUTION INTRAMUSCULAR at 05:03

## 2019-03-09 RX ADMIN — FAMOTIDINE 20 MG: 10 INJECTION, SOLUTION INTRAVENOUS at 03:03

## 2019-03-09 RX ADMIN — ONDANSETRON 4 MG: 2 INJECTION INTRAMUSCULAR; INTRAVENOUS at 03:03

## 2019-03-09 RX ADMIN — ALUMINUM HYDROXIDE, MAGNESIUM HYDROXIDE, AND SIMETHICONE 50 ML: 200; 200; 20 SUSPENSION ORAL at 03:03

## 2019-03-09 NOTE — ED NOTES
"Pt presents to ED with multiple complaints. Pt has chronic pain along with PMHx of HTN, DM, Scoliosis, A-Fib w/ RVR and Fibromyalgia. Pt states that he has been experiencing diffuse general body pain x1 week. Pt also complains of left side CP and SOB. When I asked pt to describe chest pain he stated that "my pain feels like I am going to go into A-fib w/ RVR." Pt also states that his chest pain radiates down his left arm. Pt also states that he is having intermittent neck pain which is causing him to experience TMJ.   "

## 2019-03-09 NOTE — ED PROVIDER NOTES
"Encounter Date: 3/9/2019       History     Chief Complaint   Patient presents with    Multiple Complaints     Pt c/o back pain, neck pain, abdominal pain, chest pain, SOB X1 hr. Pt called EMS from bus stop.      HPI  Review of patient's allergies indicates:   Allergen Reactions    Nsaids (non-steroidal anti-inflammatory drug) Nausea And Vomiting     "vomit, spit up blood, blood in my stool"    Pt sates that he does not have allergy    Peas Other (See Comments)     Mr. Gupta is a 64 yo M w/ pmh of a fib, asthma, COPD, DM, fibromyalgia, HTN, valve replacement surgery in the past, recently seen at [facility] yesterday for similar complaints, presenting for evaluation of epigastric discomfort and burning sensation following eating breakfast at the [facility] at 11:00pm, associated with nausea and diarrhea, but without vomiting. Denies diaphoresis, shortness of breath, cough, congestion, fever, chills.  Patient does have a history of pancreatitis in the past, but states that it is not from Etoh consumption.     Past Medical History:   Diagnosis Date    A-fib     Asthma     COPD (chronic obstructive pulmonary disease)     CRPS (complex regional pain syndrome type I)     Diabetes mellitus     Emphysema, unspecified     Fibromyalgia     Hypertension     Scoliosis     Vertigo     Jami-Parkinson-White (WPW) pattern      History reviewed. No pertinent surgical history.  History reviewed. No pertinent family history.  Social History     Tobacco Use    Smoking status: Never Smoker   Substance Use Topics    Alcohol use: Yes     Comment: socially    Drug use: No     Review of Systems   Constitutional: Negative for activity change, appetite change, chills, diaphoresis and fever.   HENT: Negative for congestion, sinus pressure, sinus pain, sneezing and sore throat.    Eyes: Negative for photophobia and visual disturbance.   Respiratory: Negative for cough, shortness of breath and stridor.    Cardiovascular: Positive " for chest pain. Negative for palpitations and leg swelling.   Gastrointestinal: Positive for abdominal pain, diarrhea and nausea. Negative for blood in stool and vomiting.   Genitourinary: Negative for dysuria, enuresis, flank pain, frequency and hematuria.   Musculoskeletal: Positive for back pain. Negative for neck pain.   Skin: Negative for pallor, rash and wound.   Neurological: Negative for dizziness, tremors, seizures, speech difficulty, weakness, light-headedness and numbness.   Psychiatric/Behavioral: Negative for agitation and confusion.       Physical Exam     Initial Vitals [03/09/19 0037]   BP Pulse Resp Temp SpO2   136/71 93 18 98.6 °F (37 °C) 97 %      MAP       --         Physical Exam    Nursing note and vitals reviewed.  Constitutional: He appears well-developed and well-nourished. He is not diaphoretic. He is cooperative.  Non-toxic appearance. He does not have a sickly appearance. He does not appear ill. No distress.   HENT:   Head: Normocephalic and atraumatic.   Left Ear: External ear normal.   Nose: Nose normal.   Mouth/Throat: No oropharyngeal exudate.   Eyes: EOM are normal. Pupils are equal, round, and reactive to light. No scleral icterus.   Neck: Normal range of motion. No thyromegaly present. No spinous process tenderness and no muscular tenderness present. No tracheal deviation and normal range of motion present. No neck rigidity. No JVD present.   Cardiovascular: Normal rate, regular rhythm, normal heart sounds and intact distal pulses. Exam reveals no gallop and no friction rub.    No murmur heard.  Pulmonary/Chest: Breath sounds normal. No accessory muscle usage or stridor. No tachypnea. No respiratory distress. He has no decreased breath sounds. He has no wheezes. He has no rhonchi. He has no rales.         He exhibits no tenderness.   Abdominal: Soft. Bowel sounds are normal. He exhibits no distension. There is tenderness (mild discomfort to deep palpation in the epigastrum). There  "is no rigidity, no rebound, no guarding, no CVA tenderness, no tenderness at McBurney's point and negative Araujo's sign.   Musculoskeletal: Normal range of motion. He exhibits no edema or tenderness.   Neurological: He is alert and oriented to person, place, and time. GCS score is 15. GCS eye subscore is 4. GCS verbal subscore is 5. GCS motor subscore is 6.   Skin: Skin is warm and dry. No rash noted. No erythema.         ED Course   Procedures  Labs Reviewed   COMPREHENSIVE METABOLIC PANEL   CBC W/ AUTO DIFFERENTIAL   TROPONIN I   B-TYPE NATRIURETIC PEPTIDE   LIPASE   URINALYSIS, REFLEX TO URINE CULTURE     HO3  62 yo M w/ epigastric pain, back pain non radiating, as well as nausea, and diarrhea started while eating breakfast at 11:00pm at the [facility].   Upon evaluation, patient is delving into social history talking about difficulty paying bills, recent difficulty with his brother, and difficulty with his landlord.   Concern for gastritis, GERD, pancreatitis, patient has a history of EtOH use but none recently, concern for possible MI/ACS, dysrhythmia, gastroenteritis.   Labs and imaging ordered.  GI cocktail, pepcid ordered.   Trop negative, bnp within normal limits, cbc and cmp without concerning abnormality currently. Patient's chest xray appears stable with no detrimental changes from prior.   EKG without st seg elevation or depression or t wave inversions.   Patient found to have elevated lipase, however, tolerating Po intake without effusions noted, pain well controlled here in the ED, no apparent distress.  Patient discharged in stable condition, tolerating PO intake well, pain controlled, nausea controlled, with robaxin for msk back pain and zofran for nausea. Patient asking for opiate pain medication, when discussed the appropriate management of pain, patient requesting tramadol, patient states "can I get a bed?" When discussing discharge with patient, he asked "so is there any way you can admit " "me?" Reviewed patient's condition, continues to ask for opiate prescription and refusing tylenol, displaying manipulative and drug seeking behavior towards discharge. Given toradol after he states "that allergy is incorrect, i've taken it before and I feel fine with toradol, NSAIDs don't cause me any problems" and discharged in stable condition, in no acute distress, in no apparent pain.    Demond Willis MD PGY3  03/09/2019 8:42 AM       Imaging Results    None                               Clinical Impression:       ICD-10-CM ICD-9-CM   1. Acute pancreatitis, unspecified complication status, unspecified pancreatitis type K85.90 577.0   2. Chest pain R07.9 786.50   3. Epigastric burning sensation R10.13 789.06                                Demond Willis MD  Resident  03/09/19 0857    "

## 2019-03-13 ENCOUNTER — HOSPITAL ENCOUNTER (EMERGENCY)
Facility: OTHER | Age: 64
Discharge: HOME | End: 2019-03-14
Attending: EMERGENCY MEDICINE
Payer: MEDICARE

## 2019-03-13 DIAGNOSIS — R07.9 CHEST PAIN: ICD-10-CM

## 2019-03-13 DIAGNOSIS — R07.9 CHEST PAIN, UNSPECIFIED TYPE: ICD-10-CM

## 2019-03-13 DIAGNOSIS — R10.9 ABDOMINAL PAIN, UNSPECIFIED ABDOMINAL LOCATION: Primary | ICD-10-CM

## 2019-03-13 DIAGNOSIS — Z59.00 HOMELESSNESS: ICD-10-CM

## 2019-03-13 LAB
ALBUMIN SERPL BCP-MCNC: 3.7 G/DL
ALP SERPL-CCNC: 78 U/L
ALT SERPL W/O P-5'-P-CCNC: 14 U/L
ANION GAP SERPL CALC-SCNC: 11 MMOL/L
AST SERPL-CCNC: 22 U/L
BASOPHILS # BLD AUTO: 0.02 K/UL
BASOPHILS NFR BLD: 0.2 %
BILIRUB SERPL-MCNC: 0.3 MG/DL
BUN SERPL-MCNC: 15 MG/DL
CALCIUM SERPL-MCNC: 9.5 MG/DL
CHLORIDE SERPL-SCNC: 104 MMOL/L
CO2 SERPL-SCNC: 24 MMOL/L
CREAT SERPL-MCNC: 0.8 MG/DL
DIFFERENTIAL METHOD: ABNORMAL
EOSINOPHIL # BLD AUTO: 0.3 K/UL
EOSINOPHIL NFR BLD: 3.5 %
ERYTHROCYTE [DISTWIDTH] IN BLOOD BY AUTOMATED COUNT: 16.2 %
EST. GFR  (AFRICAN AMERICAN): >60 ML/MIN/1.73 M^2
EST. GFR  (NON AFRICAN AMERICAN): >60 ML/MIN/1.73 M^2
GLUCOSE SERPL-MCNC: 104 MG/DL
HCT VFR BLD AUTO: 40.2 %
HGB BLD-MCNC: 12.6 G/DL
LIPASE SERPL-CCNC: 117 U/L
LYMPHOCYTES # BLD AUTO: 2.3 K/UL
LYMPHOCYTES NFR BLD: 26.6 %
MCH RBC QN AUTO: 26.4 PG
MCHC RBC AUTO-ENTMCNC: 31.3 G/DL
MCV RBC AUTO: 84 FL
MONOCYTES # BLD AUTO: 0.8 K/UL
MONOCYTES NFR BLD: 9.6 %
NEUTROPHILS # BLD AUTO: 5.2 K/UL
NEUTROPHILS NFR BLD: 59.8 %
PLATELET # BLD AUTO: 147 K/UL
PMV BLD AUTO: 8.9 FL
POTASSIUM SERPL-SCNC: 4.3 MMOL/L
PROT SERPL-MCNC: 6.5 G/DL
RBC # BLD AUTO: 4.78 M/UL
SODIUM SERPL-SCNC: 139 MMOL/L
TROPONIN I SERPL DL<=0.01 NG/ML-MCNC: 0.03 NG/ML
WBC # BLD AUTO: 8.69 K/UL

## 2019-03-13 PROCEDURE — C9113 INJ PANTOPRAZOLE SODIUM, VIA: HCPCS | Performed by: EMERGENCY MEDICINE

## 2019-03-13 PROCEDURE — 93010 EKG 12-LEAD: ICD-10-PCS | Mod: ,,, | Performed by: INTERNAL MEDICINE

## 2019-03-13 PROCEDURE — 99284 EMERGENCY DEPT VISIT MOD MDM: CPT | Mod: 25

## 2019-03-13 PROCEDURE — 85025 COMPLETE CBC W/AUTO DIFF WBC: CPT

## 2019-03-13 PROCEDURE — 63600175 PHARM REV CODE 636 W HCPCS: Performed by: EMERGENCY MEDICINE

## 2019-03-13 PROCEDURE — 96375 TX/PRO/DX INJ NEW DRUG ADDON: CPT

## 2019-03-13 PROCEDURE — 93010 ELECTROCARDIOGRAM REPORT: CPT | Mod: ,,, | Performed by: INTERNAL MEDICINE

## 2019-03-13 PROCEDURE — 96374 THER/PROPH/DIAG INJ IV PUSH: CPT

## 2019-03-13 PROCEDURE — 83690 ASSAY OF LIPASE: CPT

## 2019-03-13 PROCEDURE — 80053 COMPREHEN METABOLIC PANEL: CPT

## 2019-03-13 PROCEDURE — 93005 ELECTROCARDIOGRAM TRACING: CPT

## 2019-03-13 PROCEDURE — 84484 ASSAY OF TROPONIN QUANT: CPT

## 2019-03-13 RX ORDER — PANTOPRAZOLE SODIUM 40 MG/10ML
40 INJECTION, POWDER, LYOPHILIZED, FOR SOLUTION INTRAVENOUS
Status: COMPLETED | OUTPATIENT
Start: 2019-03-13 | End: 2019-03-13

## 2019-03-13 RX ORDER — ONDANSETRON 2 MG/ML
4 INJECTION INTRAMUSCULAR; INTRAVENOUS
Status: COMPLETED | OUTPATIENT
Start: 2019-03-13 | End: 2019-03-13

## 2019-03-13 RX ADMIN — PANTOPRAZOLE SODIUM 40 MG: 40 INJECTION, POWDER, LYOPHILIZED, FOR SOLUTION INTRAVENOUS at 10:03

## 2019-03-13 RX ADMIN — ONDANSETRON 4 MG: 2 INJECTION INTRAMUSCULAR; INTRAVENOUS at 10:03

## 2019-03-13 SDOH — SOCIAL DETERMINANTS OF HEALTH (SDOH): HOMELESSNESS UNSPECIFIED: Z59.00

## 2019-03-14 VITALS
BODY MASS INDEX: 28.95 KG/M2 | WEIGHT: 191 LBS | TEMPERATURE: 98 F | HEART RATE: 84 BPM | RESPIRATION RATE: 16 BRPM | HEIGHT: 68 IN | DIASTOLIC BLOOD PRESSURE: 74 MMHG | SYSTOLIC BLOOD PRESSURE: 138 MMHG | OXYGEN SATURATION: 97 %

## 2019-03-14 LAB
BILIRUB UR QL STRIP: NEGATIVE
CLARITY UR: CLEAR
COLOR UR: YELLOW
GLUCOSE UR QL STRIP: NEGATIVE
HGB UR QL STRIP: ABNORMAL
KETONES UR QL STRIP: NEGATIVE
LEUKOCYTE ESTERASE UR QL STRIP: NEGATIVE
NITRITE UR QL STRIP: NEGATIVE
PH UR STRIP: 6 [PH] (ref 5–8)
PROT UR QL STRIP: NEGATIVE
SP GR UR STRIP: 1.02 (ref 1–1.03)
TROPONIN I SERPL DL<=0.01 NG/ML-MCNC: 0.03 NG/ML
URN SPEC COLLECT METH UR: ABNORMAL
UROBILINOGEN UR STRIP-ACNC: NEGATIVE EU/DL

## 2019-03-14 PROCEDURE — 25000003 PHARM REV CODE 250: Performed by: EMERGENCY MEDICINE

## 2019-03-14 PROCEDURE — 81003 URINALYSIS AUTO W/O SCOPE: CPT

## 2019-03-14 PROCEDURE — 84484 ASSAY OF TROPONIN QUANT: CPT

## 2019-03-14 RX ORDER — ONDANSETRON 4 MG/1
4 TABLET, ORALLY DISINTEGRATING ORAL EVERY 8 HOURS PRN
Qty: 30 TABLET | Refills: 0 | Status: SHIPPED | OUTPATIENT
Start: 2019-03-14

## 2019-03-14 RX ORDER — ONDANSETRON 4 MG/1
4 TABLET, ORALLY DISINTEGRATING ORAL
Status: COMPLETED | OUTPATIENT
Start: 2019-03-14 | End: 2019-03-14

## 2019-03-14 RX ORDER — PANTOPRAZOLE SODIUM 20 MG/1
20 TABLET, DELAYED RELEASE ORAL DAILY
Qty: 30 TABLET | Refills: 0 | Status: SHIPPED | OUTPATIENT
Start: 2019-03-14 | End: 2020-03-13

## 2019-03-14 RX ADMIN — ONDANSETRON 4 MG: 4 TABLET, ORALLY DISINTEGRATING ORAL at 05:03

## 2019-03-14 RX ADMIN — LIDOCAINE HYDROCHLORIDE: 20 SOLUTION ORAL; TOPICAL at 05:03

## 2019-03-14 NOTE — ED NOTES
"Pt states " I hope I get admitted because the shelter kicked him out" pt repeatedly asking specifically for morphine. Provider notified   "

## 2019-03-14 NOTE — ED TRIAGE NOTES
Pt c/o nausea and vomiting for past two days. Pt also c/o of blood in urine. Pt denies any recent injury. Pt states he has chills and believes he has a fever. Pt denies cp, sob. NAD noted. Resp even and unlabored. Pt able to walk from wheelchair to bed.

## 2019-03-14 NOTE — ED PROVIDER NOTES
"Encounter Date: 3/13/2019    SCRIBE #1 NOTE: I, Vinay Preciado, am scribing for, and in the presence of, Dr. Lenz.       History     Chief Complaint   Patient presents with    Nausea     Pt came to the ED tonight c.o. NVD x this morning.      Seen by provider: 10:10 PM    Patient is a 63 y.o. male who presents to the ED via EMS with complaint of blood in stool, which began this morning. He also reports nausea, vomiting, and diarrhea. He states he "saw some blood" in his vomit. He admits that he often has vomiting and idarrhea, but states "the blood is new." He does not complain of chest pain, shortness of breath or abdominal pain. He denies eating any unusual or suspicious food prior to onset of symptoms. He states he is "under lots of stress" from "lots of unpaid bills back home." He states he is currently homeless and living on the street. He was expelled from the shelter he was previously staying at this morning. He states "is there any way you can put me in obs for the night?"       The history is provided by the patient.     Review of patient's allergies indicates:   Allergen Reactions    Nsaids (non-steroidal anti-inflammatory drug) Nausea And Vomiting     "vomit, spit up blood, blood in my stool"    Pt sates that he does not have allergy    Peas Other (See Comments)     Past Medical History:   Diagnosis Date    A-fib     Asthma     COPD (chronic obstructive pulmonary disease)     CRPS (complex regional pain syndrome type I)     Diabetes mellitus     Emphysema, unspecified     Fibromyalgia     Hypertension     Scoliosis     Vertigo     Jami-Parkinson-White (WPW) pattern      No past surgical history on file.  No family history on file.  Social History     Tobacco Use    Smoking status: Never Smoker   Substance Use Topics    Alcohol use: Yes     Comment: socially    Drug use: No     Review of Systems   Constitutional: Negative for fever.   HENT: Negative for sore throat.    Respiratory: " Negative for shortness of breath.    Cardiovascular: Negative for chest pain.   Gastrointestinal: Positive for blood in stool, diarrhea, nausea and vomiting. Negative for abdominal pain.   Genitourinary: Negative for dysuria.   Musculoskeletal: Negative for back pain.   Skin: Negative for rash.   Neurological: Negative for weakness.   Hematological: Does not bruise/bleed easily.   All other systems reviewed and are negative.      Physical Exam     Initial Vitals [03/13/19 2118]   BP Pulse Resp Temp SpO2   (!) 164/91 101 18 98.8 °F (37.1 °C) 98 %      MAP       --         Physical Exam    Nursing note and vitals reviewed.  Constitutional: He appears well-developed and well-nourished. He is not diaphoretic. No distress.   Unkempt appearance.   HENT:   Head: Normocephalic and atraumatic.   Eyes: Conjunctivae and EOM are normal. Pupils are equal, round, and reactive to light.   Neck: Normal range of motion. Neck supple.   Cardiovascular: Normal rate, regular rhythm, normal heart sounds and normal pulses. Exam reveals no gallop and no friction rub.    No murmur heard.  Pulmonary/Chest: Breath sounds normal. No respiratory distress. He has no wheezes. He has no rhonchi. He has no rales.   Abdominal: Soft. Bowel sounds are normal. He exhibits no distension. There is no tenderness. There is no rebound and no guarding.   Musculoskeletal: He exhibits no edema.   Neurological: He is alert and oriented to person, place, and time. He has normal strength. No cranial nerve deficit or sensory deficit. GCS score is 15. GCS eye subscore is 4. GCS verbal subscore is 5. GCS motor subscore is 6.   Skin: Skin is warm and dry.   Median sternotomy scar.   Psychiatric: He has a normal mood and affect. His behavior is normal. Thought content normal.         ED Course   Procedures  Labs Reviewed   CBC W/ AUTO DIFFERENTIAL - Abnormal; Notable for the following components:       Result Value    Hemoglobin 12.6 (*)     MCH 26.4 (*)     MCHC 31.3  (*)     RDW 16.2 (*)     Platelets 147 (*)     MPV 8.9 (*)     All other components within normal limits   LIPASE - Abnormal; Notable for the following components:    Lipase 117 (*)     All other components within normal limits   TROPONIN I - Abnormal; Notable for the following components:    Troponin I 0.032 (*)     All other components within normal limits   URINALYSIS, REFLEX TO URINE CULTURE - Abnormal; Notable for the following components:    Occult Blood UA Trace (*)     All other components within normal limits    Narrative:     Preferred Collection Type->Urine, Clean Catch   TROPONIN I - Abnormal; Notable for the following components:    Troponin I 0.028 (*)     All other components within normal limits   COMPREHENSIVE METABOLIC PANEL     EKG Readings: (Independently Interpreted)   Normal sinus rhythm. Left anterior fascicular block. LVH. No change compared to 3/9/19.       Imaging Results    None          Medical Decision Making:   Initial Assessment:   62 y/o M with several complaints, frequent visits to the ED.  Poor historian, high suspicion for seeking secondary gain.  Will obtain basic labs to check for stability from a few days ago.   Differential Diagnosis:   Differential diagnosis includes, but is not limited to:  GERD, intestinal spasm, gastroenteritis, gastritis, ulcer, cholecystitis, gallstones, pancreatitis, ileus, small bowel obstruction, appendicitis, constipation, intestinal gas pain, myocardial ischemia, pericarditis, pulmonary embolus, chest wall pain, pleural inflammation and pulmonary infectious causes.    Independently Interpreted Test(s):   I have ordered and independently interpreted EKG Reading(s) - see prior notes  Clinical Tests:   Lab Tests: Ordered and Reviewed  Medical Tests: Ordered and Reviewed  ED Management:  Patient improved with treatment in the emergency department and comfortable going home. Discussed reasons to return and importance of followup.  Patient understands that  the emergency visit today is primarily to address immediate concerns and to rule out emergent cause of symptoms and that they may require further workup and evaluation as an outpatient. All questions addressed and patient given discharge instructions and followup information.               Scribe Attestation:   Scribe #1: I performed the above scribed service and the documentation accurately describes the services I performed. I attest to the accuracy of the note.    Attending Attestation:           Physician Attestation for Scribe:  Physician Attestation Statement for Scribe #1: I, Dr. Lenz, reviewed documentation, as scribed by Vinay Preciado in my presence, and it is both accurate and complete.                    Clinical Impression:     1. Abdominal pain, unspecified abdominal location    2. Chest pain    3. Chest pain, unspecified type    4. Homelessness                                Guillermina Lenz MD  03/18/19 2203

## 2019-03-14 NOTE — ED NOTES
When administering pt's medication pt asked if I thought the provider would give him morphine if I asked her for him. The pt stated he would be okay with 2 mg and also stated that he was able to surprisingly get 6 mg at another place but would not say where or when. Pt asked if the provider would get mad if he asked for morphine. I informed pt if he was in pain I could let the provider know. Pt said he is in pain and as I was leaving the room that he also has hx of stones. NAD noted. Resp even and unlabored.

## 2020-03-20 ENCOUNTER — HOSPITAL ENCOUNTER (EMERGENCY)
Facility: HOSPITAL | Age: 65
Discharge: HOME | End: 2020-03-21
Attending: EMERGENCY MEDICINE
Payer: COMMERCIAL

## 2020-03-20 DIAGNOSIS — E86.0 DEHYDRATION: ICD-10-CM

## 2020-03-20 DIAGNOSIS — R07.9 CHRONIC CHEST PAIN: Primary | ICD-10-CM

## 2020-03-20 DIAGNOSIS — R07.89 ATYPICAL CHEST PAIN: ICD-10-CM

## 2020-03-20 DIAGNOSIS — G89.29 CHRONIC CHEST PAIN: Primary | ICD-10-CM

## 2020-03-20 DIAGNOSIS — R07.9 CHEST PAIN: ICD-10-CM

## 2020-03-20 PROCEDURE — 96360 HYDRATION IV INFUSION INIT: CPT

## 2020-03-20 PROCEDURE — 93010 ELECTROCARDIOGRAM REPORT: CPT | Mod: ,,, | Performed by: INTERNAL MEDICINE

## 2020-03-20 PROCEDURE — 93005 ELECTROCARDIOGRAM TRACING: CPT

## 2020-03-20 PROCEDURE — 93010 EKG 12-LEAD: ICD-10-PCS | Mod: ,,, | Performed by: INTERNAL MEDICINE

## 2020-03-20 PROCEDURE — 99284 EMERGENCY DEPT VISIT MOD MDM: CPT | Mod: 25

## 2020-03-20 RX ORDER — SODIUM CHLORIDE 9 MG/ML
1000 INJECTION, SOLUTION INTRAVENOUS
Status: COMPLETED | OUTPATIENT
Start: 2020-03-21 | End: 2020-03-21

## 2020-03-20 RX ORDER — ASPIRIN 325 MG
325 TABLET ORAL
Status: COMPLETED | OUTPATIENT
Start: 2020-03-20 | End: 2020-03-21

## 2020-03-20 RX ORDER — MORPHINE SULFATE 15 MG/1
15 TABLET ORAL
Status: COMPLETED | OUTPATIENT
Start: 2020-03-21 | End: 2020-03-21

## 2020-03-21 VITALS
RESPIRATION RATE: 18 BRPM | TEMPERATURE: 98 F | SYSTOLIC BLOOD PRESSURE: 102 MMHG | HEART RATE: 82 BPM | OXYGEN SATURATION: 94 % | BODY MASS INDEX: 23.34 KG/M2 | DIASTOLIC BLOOD PRESSURE: 62 MMHG | HEIGHT: 68 IN | WEIGHT: 154 LBS

## 2020-03-21 LAB
ALBUMIN SERPL BCP-MCNC: 3.1 G/DL (ref 3.5–5.2)
ALP SERPL-CCNC: 107 U/L (ref 55–135)
ALT SERPL W/O P-5'-P-CCNC: 11 U/L (ref 10–44)
ANION GAP SERPL CALC-SCNC: 14 MMOL/L (ref 8–16)
ANISOCYTOSIS BLD QL SMEAR: SLIGHT
AST SERPL-CCNC: 11 U/L (ref 10–40)
BASOPHILS NFR BLD: 0 % (ref 0–1.9)
BILIRUB SERPL-MCNC: 0.2 MG/DL (ref 0.1–1)
BNP SERPL-MCNC: 33 PG/ML (ref 0–99)
BUN SERPL-MCNC: 27 MG/DL (ref 8–23)
CALCIUM SERPL-MCNC: 8.5 MG/DL (ref 8.7–10.5)
CHLORIDE SERPL-SCNC: 103 MMOL/L (ref 95–110)
CO2 SERPL-SCNC: 22 MMOL/L (ref 23–29)
CREAT SERPL-MCNC: 1.2 MG/DL (ref 0.5–1.4)
DIFFERENTIAL METHOD: ABNORMAL
EOSINOPHIL NFR BLD: 1 % (ref 0–8)
ERYTHROCYTE [DISTWIDTH] IN BLOOD BY AUTOMATED COUNT: 18.7 % (ref 11.5–14.5)
EST. GFR  (AFRICAN AMERICAN): >60 ML/MIN/1.73 M^2
EST. GFR  (NON AFRICAN AMERICAN): >60 ML/MIN/1.73 M^2
GLUCOSE SERPL-MCNC: 132 MG/DL (ref 70–110)
HCT VFR BLD AUTO: 23.6 % (ref 40–54)
HGB BLD-MCNC: 7.3 G/DL (ref 14–18)
IMM GRANULOCYTES # BLD AUTO: ABNORMAL K/UL (ref 0–0.04)
IMM GRANULOCYTES NFR BLD AUTO: ABNORMAL % (ref 0–0.5)
LYMPHOCYTES NFR BLD: 15 % (ref 18–48)
MCH RBC QN AUTO: 28.4 PG (ref 27–31)
MCHC RBC AUTO-ENTMCNC: 30.9 G/DL (ref 32–36)
MCV RBC AUTO: 92 FL (ref 82–98)
MONOCYTES NFR BLD: 6 % (ref 4–15)
NEUTROPHILS NFR BLD: 76 % (ref 38–73)
NEUTS BAND NFR BLD MANUAL: 2 %
NRBC BLD-RTO: 0 /100 WBC
PLATELET # BLD AUTO: 56 K/UL (ref 150–350)
PLATELET BLD QL SMEAR: ABNORMAL
PMV BLD AUTO: 9 FL (ref 9.2–12.9)
POLYCHROMASIA BLD QL SMEAR: ABNORMAL
POTASSIUM SERPL-SCNC: 3.6 MMOL/L (ref 3.5–5.1)
PROT SERPL-MCNC: 5.7 G/DL (ref 6–8.4)
RBC # BLD AUTO: 2.57 M/UL (ref 4.6–6.2)
SODIUM SERPL-SCNC: 139 MMOL/L (ref 136–145)
TROPONIN I SERPL DL<=0.01 NG/ML-MCNC: 0.01 NG/ML (ref 0–0.03)
WBC # BLD AUTO: 17.18 K/UL (ref 3.9–12.7)

## 2020-03-21 PROCEDURE — 85027 COMPLETE CBC AUTOMATED: CPT

## 2020-03-21 PROCEDURE — 25000003 PHARM REV CODE 250: Performed by: EMERGENCY MEDICINE

## 2020-03-21 PROCEDURE — 84484 ASSAY OF TROPONIN QUANT: CPT

## 2020-03-21 PROCEDURE — 85007 BL SMEAR W/DIFF WBC COUNT: CPT

## 2020-03-21 PROCEDURE — 63600175 PHARM REV CODE 636 W HCPCS: Performed by: EMERGENCY MEDICINE

## 2020-03-21 PROCEDURE — 83880 ASSAY OF NATRIURETIC PEPTIDE: CPT

## 2020-03-21 PROCEDURE — 80053 COMPREHEN METABOLIC PANEL: CPT

## 2020-03-21 RX ADMIN — ASPIRIN 325 MG ORAL TABLET 325 MG: 325 PILL ORAL at 12:03

## 2020-03-21 RX ADMIN — MORPHINE SULFATE 15 MG: 15 TABLET ORAL at 12:03

## 2020-03-21 RX ADMIN — SODIUM CHLORIDE 1000 ML: 0.9 INJECTION, SOLUTION INTRAVENOUS at 02:03

## 2020-03-21 NOTE — ED TRIAGE NOTES
"PT BROUGHT IN BY EMS FROM AIRPORT. PT FLEW HERE FROM [place]. STATES VISITING [facility] "WAS ON HIS BUCKET LIST". PT STATES HAS END STAGE LUNG CANCER. PT'S INITIAL COMPLAINT WAS CHEST PAIN, NO STATES HE "HURTS ALL OVER, IS EITHER CONSTIPATED OR HAS DIARRHEA, HURTS WHEN HE URINATES, BASICALLY HE "HURTS EVERWHERE". PT'S BP 70/36. PT STATES "BP STAYS LOW". PT IS ASYMPTOMATIC WITH HYPOTENSION.  "

## 2020-03-21 NOTE — ED PROVIDER NOTES
"Encounter Date: 3/20/2020    SCRIBE #1 NOTE: I, Aleena Tomas, am scribing for, and in the presence of,  Dr. Eldridge. I have scribed the entire note.       History     Chief Complaint   Patient presents with    Chest Pain     Pt c/o chest pain, and "hurting all over".     Antonio Gupta is a 64 y.o. male who  has a past medical history of A-fib, Asthma, COPD (chronic obstructive pulmonary disease), CRPS (complex regional pain syndrome type I), Diabetes mellitus, Emphysema, unspecified, Fibromyalgia, Hypertension, Scoliosis, Vertigo, and Jami-Parkinson-White (WPW) pattern.    The patient presents to the ED via EMS due to multiple complaints.   EMS was called to the airport, and patient reportedly was complaining of chest pain.  However, while the chief complaint by nursing was documented as chest pain, the patient currently endorses having pain all over, which he states is due to cancer pain. He reports a history of metastatic lung cancer, but is unable to say when his last dose of chemotherapy was.  He endorses numerous other complaints, including intermittent diarrhea and constipation, N/V, intermittent hematuria, fatigue due to chronic anemia, weakness, shortness of breath, fever of 103 a week ago, intermittent palpitations from a-fib and WPW. He states he feels his blood counts are low and will need a transfusion of both RBCs and platelets, as his last platelet count was 10.   Patient is unsure if he had any sick contacts, but states he is from [place], and flew into [place] just prior to arrival. He reports he has been feeling sick lately, and only came to [place] because it is on his "bucket list." He has a brother who lives in [place] but states he helps him out financially, so he is staying at a hotel here.  Patient repeats multiple times "I'm not trying to get high or anything" but requests IV narcotics multiple times throughout the interview.  He also mentioned multiple times "I hope you keep me here, I " "think I need to be admitted."    The history is provided by the patient.     Review of patient's allergies indicates:   Allergen Reactions    Peas Other (See Comments)    Nsaids (non-steroidal anti-inflammatory drug) Nausea And Vomiting     "vomit, spit up blood, blood in my stool"    Pt sates that he does not have allergy     Past Medical History:   Diagnosis Date    A-fib     Asthma     Cancer     COPD (chronic obstructive pulmonary disease)     CRPS (complex regional pain syndrome type I)     Diabetes mellitus     Emphysema, unspecified     Fibromyalgia     Hypertension     Scoliosis     Stroke     Vertigo     Jami-Parkinson-White (WPW) pattern      No past surgical history on file.  No family history on file.  Social History     Tobacco Use    Smoking status: Never Smoker   Substance Use Topics    Alcohol use: Yes     Comment: socially    Drug use: No     Review of Systems   Constitutional: Positive for fatigue and fever. Negative for appetite change and chills.   HENT: Negative for sore throat.    Respiratory: Positive for shortness of breath. Negative for cough.    Cardiovascular: Positive for chest pain. Negative for palpitations and leg swelling.   Gastrointestinal: Positive for constipation, diarrhea, nausea and vomiting. Negative for abdominal pain.   Genitourinary: Positive for hematuria. Negative for dysuria, frequency and urgency.   Musculoskeletal: Positive for arthralgias and myalgias. Negative for back pain.   Skin: Negative for rash and wound.   Neurological: Positive for weakness. Negative for dizziness.   Hematological: Does not bruise/bleed easily.   Psychiatric/Behavioral: Negative for agitation, behavioral problems and confusion.       Physical Exam     Initial Vitals [03/20/20 2335]   BP Pulse Resp Temp SpO2   (!) 70/36 89 20 98.1 °F (36.7 °C) 99 %      MAP       --         Physical Exam    Nursing note and vitals reviewed.  Constitutional: He is not diaphoretic. He appears " ill. No distress.   Appears chronically ill, no acute distress.   HENT:   Head: Normocephalic and atraumatic.   Mouth/Throat: Oropharynx is clear and moist.   Eyes: EOM are normal. Pupils are equal, round, and reactive to light.   Neck: No tracheal deviation present.   Cardiovascular: Normal rate, regular rhythm, normal heart sounds and intact distal pulses.   Pulmonary/Chest: Breath sounds normal. No stridor. No respiratory distress.   Lungs grossly clear.   Abdominal: Soft. He exhibits no distension and no mass. There is no tenderness.   Musculoskeletal: Normal range of motion. He exhibits no edema.   Neurological: He is alert and oriented to person, place, and time. No cranial nerve deficit or sensory deficit.   Skin: Skin is warm and dry. Capillary refill takes less than 2 seconds. No rash noted.   Psychiatric: He has a normal mood and affect. His behavior is normal. Thought content normal.         ED Course   ED US Procedure Guidance  Date/Time: 3/21/2020 2:06 AM  Performed by: Zbigniew Eldridge MD  Authorized by: Zbigniew Eldridge MD   Comments: LUE PIV inserted under US guidance        Labs Reviewed   CBC W/ AUTO DIFFERENTIAL - Abnormal; Notable for the following components:       Result Value    WBC 17.18 (*)     RBC 2.57 (*)     Hemoglobin 7.3 (*)     Hematocrit 23.6 (*)     Mean Corpuscular Hemoglobin Conc 30.9 (*)     RDW 18.7 (*)     Platelets 56 (*)     MPV 9.0 (*)     Gran% 76.0 (*)     Lymph% 15.0 (*)     Platelet Estimate Decreased (*)     All other components within normal limits   COMPREHENSIVE METABOLIC PANEL - Abnormal; Notable for the following components:    CO2 22 (*)     Glucose 132 (*)     BUN, Bld 27 (*)     Calcium 8.5 (*)     Total Protein 5.7 (*)     Albumin 3.1 (*)     All other components within normal limits   TROPONIN I   B-TYPE NATRIURETIC PEPTIDE     EKG Readings: (Independently Interpreted)   NSR; Rate 87; No ST changes; No ischemia; Normal intervals. Stable from prior March 2019.        Imaging Results          X-Ray Chest AP Portable (Final result)  Result time 03/21/20 01:15:25    Final result by Cam Cuevas MD (03/21/20 01:15:25)                 Impression:      Patchy left perihilar and basilar opacities which are nonspecific and may relate to asymmetric edema, aspiration, or infectious process.  Clinical correlation advised with prior imaging in light of patient's reported history of metastatic lung cancer.      Electronically signed by: Cam Cuevas MD  Date:    03/21/2020  Time:    01:15             Narrative:    EXAMINATION:  XR CHEST AP PORTABLE    CLINICAL HISTORY:  chest pain, hypotension;    TECHNIQUE:  Single frontal view of the chest was performed.    COMPARISON:  Chest radiograph 03/09/2019, CTA chest 02/14/2019    FINDINGS:  Cardiac monitoring leads overlie the chest.  There is postoperative change of prior median sternotomy.  Cardiomediastinal silhouette appears relatively stable.  There is elevation of the left hemidiaphragm.  The lungs demonstrate coarse interstitial attenuation bilaterally.  There is subsegmental atelectasis at the left lung base.  There are patchy left perihilar and basilar opacities, not well appreciated on prior examination.  No large pleural effusion appreciated.  There is no evidence of pneumothorax.  Osseous structures appear grossly intact.                                 Medical Decision Making:   History:   Old Medical Records: I decided to obtain old medical records.  Old Records Summarized: other records.       <> Summary of Records: Extensive chart review performed:    Patient currently lives in [place], he was recently evaluated on 3/7 due to chest pain. Patient has a history of metastatic lung cancer with metastases to abdomen and liver. He was discharged on 3/18. Discharge summary notes patient has chronic abdominal pain treated with oral medication. Palliative care evaluated and recommends avoiding IV narcotics.  He was also noted to  "have chronic chest pain and has had 7 hospitalizations in [place] in 2019. Patient's pain was felt to be due to cancer. Heart cath in 2017 was non-obstructive. Documentation also notes patient has cluster B personality disorder with malingering and manipulative behavior. He has had mulitple hospitalizations out of state as well. During prior admissions he has delayed discharge, appealed discharge, and has multiple notes questioning his compliance.  Patient was noted to have chronic hematuria and has had prior UA and urine cultures that were unremarkable. Also has a history of homelessness.  has provided [facility] and multiple homeless resources, but patient has declined in the past.    From prior DC summaries in [place]:  "She states the [facility] attempted to arrange furniture for patient but he was unavailable at time of delivery. She has encouraged [facility] to come to [facility] to [facility] and they can assist with furniture or hospital bed for home. The [facility] feels patient has personality disorder rather than schizoaffective. She states he has never mentioned any delusions during her conversations with him. She states he has a brother that lives in [place] and although his brother asked him not to come down (relationship complex) the patient does travel to [place] from time to time. He also has a sister he talks to once in a while but is not close. She states he is often out of town and difficult to reach, buys plane ticket to travel to [place] and [facility]; sometime in lieu of rent. She states patient frequently speaks with  from a shelter where he was previously living. She states this  said his home was hoarded. They are also aware of the bed bug issue. Patient's landlord has offered to spray for roaches but patient will not clean home enough to allow spraying. She fears that if landlord finds out he has bed bugs he may be evicted. She states if patient will re-establish " "with [facility] for [facility] they may be able to assist with his living situation, furniture, arrange home support, and setup Palliative Care clinic visits. PCR will discuss this with patient prior to discharge. PCR gave contact information for Albina SteeleYESENIA [phone] to follow-up if needed. PCRM also left my contact information for coordination of care for future hospitalizations.     PCRM met with patient to discuss discharge as timeline was extended from 12 to 1PM. Patient was given enema around 1130AM, reports he has not yet had BM. PCRM spoke with medical team- not concerned about need for BM prior to discharge. Patient refusing to leave at this time, requesting suppository and another dose of miralax. PCRM explained he does not need to stay in the hospital to have BM and can be managed at home. Patient states he needs until 2PM. PCRM asked what will happen if he feels he is not ready at 2PM. Patient states he will be ready and agreeable to discharge. PCRM encouraged patient to shower now (still laying in bed in gown) and PCRM will re-visit before 2PM. PCRM delivered discharge medications (levaquin and metoprolol) to patient's RN Luly. MD ordered suppository for now; bedside RN updated on plan of care."    Differential Diagnosis:   Differential Diagnosis includes, but is not limited to:  Bowel obstruction, incarcerated/strangulated hernia, ileus, appendicitis, cholecystitis, aspirated foreign body, esophageal food impaction, biliary colic, colitis/diverticulitis, gastroenteritis, esophagitis, hepatitis, pancreatitis, GERD, PUD, constipation, nephrolithiasis, UTI/pyelonephritis.    Clinical Tests:   Lab Tests: Ordered and Reviewed  Radiological Study: Ordered and Reviewed  Medical Tests: Ordered and Reviewed    On re-evaluation, the patient's status has improved.  After complete ED evaluation, clinical impression is most consistent with chronic pain, metastatic lung cancer.  PCP follow-up within 2-3 days " "was recommended.    After taking into careful account the patient's history, physical exam findings, as well as empirical and objective data obtained throughout ED workup, I feel no emergent medical condition has been identified. No further evaluation or admission was felt to be required, and the patient is stable for discharge from the ED. The patient and any additional family present were updated with test results, overall clinical impression, and recommended further plan of care, including discharge instructions as provided and outpatient follow-up for continued evaluation and management as needed. All questions were answered. The patient expressed understanding and agreed with current plan for discharge and follow-up plan of care. Strict ED return precautions were provided, including return/worsening of current symptoms, new symptoms, or any other concerns.                   ED Course as of Mar 23 0233   Fri Mar 20, 2020   2351 64-year-old male with extensive past medical history including metastatic lung cancer, paroxysmal A-Fib, CAD/MI, Congestive heart failure, COPD, Crohn's disease, HTN DM, GERD, CVA, presenting to ED with numerous complaints, primarily nausea/vomiting/diarrhea, as well as generalized body aches.  He reports he has a history of metastatic lung cancer and takes morphine orally, but he states that the oral medication is not working, and request IV morphine for his pain.  He is from [place] when traveling to [place] because it is on his "bucket list."   On arrival, patient is hypotensive to 70/30, vitals otherwise reassuring. Patient initiall;y indicated to EMS that his BP normally runs low, but tells ED physician that his BP is never low. Patient is awake and oriented, and in no acute distress.  Will obtain cardiac evaluation, basic labs, CXR, give IV fluids, give home dose of p.o. pain medication, and continue to monitor.    [SS]   Sat Mar 21, 2020   0307 EKG without ischemia or " arrhythmia.  CXR with L-sided perihilar and basilar opacities, likely consistent with patient's known lung cancer, as previous imaging noted similar appearance earlier this year.  Patient afebrile, no cough or respiratory symptoms, unlikely pneumonia.  Labs with WBC 17 K, patient with chronic elevated WBC count during prior admissions in the hospital.  WBC 22 K on 03/07.  H/H 7/23, stable from prior values throughout the last month during his previous admissions in [place].  Platelet count 56, improved from prior value of 11 on 03/17.      [SS]   0310 On reassessment, patient's blood pressure has significantly improved, 100/60.  Vitals otherwise have remained stable, and he is afebrile and in no acute distress.  Patient with multiple chronic complaints, no acute emergent process evident on today's visit. I also feel that the risks of admission to the hospital  during the [place]VID 19 pandemic would far outweigh the benefits to this patient at this time, given his multiple co-morbidities as well as chronic cancer diagnosis. Given chart review, I also highly suspect a component of malingering and secondary gain, given his repeated requests for IV narcotics.  I feel patient is stable for discharge to follow up with his PCP and oncologist as scheduled.  He may return to the ER for any worsening symptoms or other concerns.    [SS]      ED Course User Index  [SS] Zbigniew Eldridge MD                Clinical Impression:       ICD-10-CM ICD-9-CM   1. Chronic chest pain R07.9 786.50    G89.29 338.29   2. Chest pain R07.9 786.50   3. Atypical chest pain R07.89 786.59   4. Dehydration E86.0 276.51         Disposition:   Disposition: Discharged  Condition: Stable     ED Disposition Condition    Discharge Stable        ED Prescriptions     None        Follow-up Information     Follow up With Specialties Details Why Contact Info    your oncologist  Schedule an appointment as soon as possible for a visit   as soon as possible                           I, Dr. Zbigniew Eldridge, personally performed the services described in this documentation. All medical record entries made by the scribe were at my direction and in my presence.  I have reviewed the chart and agree that the record reflects my personal performance and is accurate and complete.     Zbigniew Eldridge MD.                 Zbigniew Eldridge MD  03/23/20 024